# Patient Record
Sex: MALE | Race: WHITE | HISPANIC OR LATINO | Employment: OTHER
[De-identification: names, ages, dates, MRNs, and addresses within clinical notes are randomized per-mention and may not be internally consistent; named-entity substitution may affect disease eponyms.]

---

## 2017-08-15 ENCOUNTER — CHARTING TRANS (OUTPATIENT)
Dept: OTHER | Age: 82
End: 2017-08-15

## 2018-07-20 ENCOUNTER — CHARTING TRANS (OUTPATIENT)
Dept: OTHER | Age: 83
End: 2018-07-20

## 2018-07-27 ENCOUNTER — CHARTING TRANS (OUTPATIENT)
Dept: OTHER | Age: 83
End: 2018-07-27

## 2019-03-08 ENCOUNTER — EXTERNAL RECORD (OUTPATIENT)
Dept: OTHER | Age: 84
End: 2019-03-08

## 2019-05-31 ENCOUNTER — EXTERNAL RECORD (OUTPATIENT)
Dept: OTHER | Age: 84
End: 2019-05-31

## 2020-02-05 ENCOUNTER — OFFICE VISIT (OUTPATIENT)
Dept: OPHTHALMOLOGY | Age: 85
End: 2020-02-05

## 2020-02-05 DIAGNOSIS — E11.3593 PROLIFERATIVE DIABETIC RETINOPATHY OF BOTH EYES WITHOUT MACULAR EDEMA ASSOCIATED WITH TYPE 2 DIABETES MELLITUS (CMD): Primary | ICD-10-CM

## 2020-02-05 PROCEDURE — 92014 COMPRE OPH EXAM EST PT 1/>: CPT | Performed by: OPHTHALMOLOGY

## 2020-02-05 RX ORDER — FINASTERIDE 5 MG/1
TABLET, FILM COATED ORAL
Refills: 1 | COMMUNITY
Start: 2019-12-01

## 2020-02-05 RX ORDER — OMEPRAZOLE 20 MG/1
20 CAPSULE, DELAYED RELEASE ORAL
COMMUNITY

## 2020-02-05 RX ORDER — BACITRACIN ZINC 500 [USP'U]/G
OINTMENT TOPICAL
COMMUNITY

## 2020-02-05 RX ORDER — CLOPIDOGREL BISULFATE 75 MG/1
75 TABLET ORAL DAILY
COMMUNITY
Start: 2019-04-01 | End: 2021-01-01 | Stop reason: SDUPTHER

## 2020-02-05 RX ORDER — ACETAMINOPHEN 325 MG/1
650 TABLET ORAL
COMMUNITY
Start: 2019-11-02

## 2020-02-05 RX ORDER — CETIRIZINE HYDROCHLORIDE 10 MG/1
TABLET ORAL
Refills: 3 | COMMUNITY
Start: 2019-10-31

## 2020-02-05 RX ORDER — CARVEDILOL 3.12 MG/1
3.12 TABLET ORAL EVERY 12 HOURS
COMMUNITY
Start: 2019-04-01 | End: 2021-01-01 | Stop reason: SDUPTHER

## 2020-02-05 RX ORDER — BUMETANIDE 2 MG/1
2 TABLET ORAL EVERY 12 HOURS
COMMUNITY
Start: 2019-04-01

## 2020-02-05 RX ORDER — GABAPENTIN 300 MG/1
CAPSULE ORAL
Refills: 1 | COMMUNITY
Start: 2019-12-20

## 2020-02-05 RX ORDER — FLUTICASONE PROPIONATE 50 MCG
1 SPRAY, SUSPENSION (ML) NASAL
COMMUNITY
Start: 2016-03-12

## 2020-02-05 RX ORDER — LOSARTAN POTASSIUM 25 MG/1
25 TABLET ORAL
COMMUNITY
Start: 2019-10-31

## 2020-02-12 ENCOUNTER — APPOINTMENT (OUTPATIENT)
Dept: OPHTHALMOLOGY | Age: 85
End: 2020-02-12

## 2020-12-05 ENCOUNTER — APPOINTMENT (OUTPATIENT)
Dept: LAB | Facility: HOSPITAL | Age: 85
End: 2020-12-05
Attending: SURGERY
Payer: MEDICARE

## 2020-12-05 DIAGNOSIS — L97.909 ATHEROSCLEROSIS OF ARTERY OF EXTREMITY WITH ULCERATION (HCC): ICD-10-CM

## 2020-12-05 DIAGNOSIS — I70.299 ATHEROSCLEROSIS OF ARTERY OF EXTREMITY WITH ULCERATION (HCC): ICD-10-CM

## 2021-01-01 ENCOUNTER — APPOINTMENT (OUTPATIENT)
Dept: CARDIOLOGY | Age: 86
End: 2021-01-01

## 2021-01-01 PROCEDURE — 99232 SBSQ HOSP IP/OBS MODERATE 35: CPT | Performed by: NURSE PRACTITIONER

## 2021-01-01 PROCEDURE — 99221 1ST HOSP IP/OBS SF/LOW 40: CPT | Performed by: INTERNAL MEDICINE

## 2021-01-01 SDOH — HEALTH STABILITY: MENTAL HEALTH: HOW OFTEN DO YOU HAVE A DRINK CONTAINING ALCOHOL?: NEVER

## 2021-01-02 ENCOUNTER — LAB ENCOUNTER (OUTPATIENT)
Dept: LAB | Facility: HOSPITAL | Age: 86
DRG: 253 | End: 2021-01-02
Attending: SURGERY
Payer: MEDICARE

## 2021-01-02 DIAGNOSIS — L97.909 ATHEROSCLEROSIS OF ARTERY OF EXTREMITY WITH ULCERATION (HCC): ICD-10-CM

## 2021-01-02 DIAGNOSIS — I70.299 ATHEROSCLEROSIS OF ARTERY OF EXTREMITY WITH ULCERATION (HCC): ICD-10-CM

## 2021-01-02 LAB — SARS-COV-2 RNA RESP QL NAA+PROBE: NOT DETECTED

## 2021-01-05 ENCOUNTER — HOSPITAL ENCOUNTER (INPATIENT)
Dept: INTERVENTIONAL RADIOLOGY/VASCULAR | Facility: HOSPITAL | Age: 86
LOS: 3 days | Discharge: HOME | DRG: 253 | End: 2021-01-08
Attending: SURGERY | Admitting: SURGERY
Payer: MEDICARE

## 2021-01-05 DIAGNOSIS — I70.299 ATHEROSCLEROSIS OF ARTERY OF EXTREMITY WITH ULCERATION (HCC): Primary | ICD-10-CM

## 2021-01-05 DIAGNOSIS — L97.909 ATHEROSCLEROSIS OF ARTERY OF EXTREMITY WITH ULCERATION (HCC): Primary | ICD-10-CM

## 2021-01-05 LAB
GLUCOSE BLD-MCNC: 183 MG/DL (ref 70–99)
GLUCOSE BLD-MCNC: 205 MG/DL (ref 70–99)
GLUCOSE BLD-MCNC: 327 MG/DL (ref 70–99)

## 2021-01-05 PROCEDURE — 99222 1ST HOSP IP/OBS MODERATE 55: CPT | Performed by: HOSPITALIST

## 2021-01-05 RX ORDER — MORPHINE SULFATE 4 MG/ML
8 INJECTION, SOLUTION INTRAMUSCULAR; INTRAVENOUS
Status: DISCONTINUED | OUTPATIENT
Start: 2021-01-05 | End: 2021-01-08

## 2021-01-05 RX ORDER — FUROSEMIDE 40 MG/1
40 TABLET ORAL
Status: DISCONTINUED | OUTPATIENT
Start: 2021-01-05 | End: 2021-01-07

## 2021-01-05 RX ORDER — CARVEDILOL 3.12 MG/1
3.12 TABLET ORAL DAILY
Status: DISCONTINUED | OUTPATIENT
Start: 2021-01-05 | End: 2021-01-08

## 2021-01-05 RX ORDER — SODIUM CHLORIDE 9 MG/ML
INJECTION, SOLUTION INTRAVENOUS CONTINUOUS
Status: DISCONTINUED | OUTPATIENT
Start: 2021-01-05 | End: 2021-01-08

## 2021-01-05 RX ORDER — CLOPIDOGREL BISULFATE 75 MG/1
75 TABLET ORAL NIGHTLY
Status: DISCONTINUED | OUTPATIENT
Start: 2021-01-05 | End: 2021-01-08

## 2021-01-05 RX ORDER — ATORVASTATIN CALCIUM 20 MG/1
20 TABLET, FILM COATED ORAL NIGHTLY
Status: DISCONTINUED | OUTPATIENT
Start: 2021-01-05 | End: 2021-01-08

## 2021-01-05 RX ORDER — ENALAPRIL MALEATE 5 MG/1
5 TABLET ORAL DAILY
Status: DISCONTINUED | OUTPATIENT
Start: 2021-01-06 | End: 2021-01-08

## 2021-01-05 RX ORDER — PANTOPRAZOLE SODIUM 40 MG/1
40 TABLET, DELAYED RELEASE ORAL
Status: DISCONTINUED | OUTPATIENT
Start: 2021-01-06 | End: 2021-01-08

## 2021-01-05 RX ORDER — HYDROCODONE BITARTRATE AND ACETAMINOPHEN 5; 325 MG/1; MG/1
1 TABLET ORAL EVERY 4 HOURS PRN
Status: DISCONTINUED | OUTPATIENT
Start: 2021-01-05 | End: 2021-01-08

## 2021-01-05 RX ORDER — GABAPENTIN 300 MG/1
300 CAPSULE ORAL 2 TIMES DAILY
Status: DISCONTINUED | OUTPATIENT
Start: 2021-01-05 | End: 2021-01-08

## 2021-01-05 RX ORDER — ASPIRIN 81 MG/1
81 TABLET ORAL DAILY
Status: DISCONTINUED | OUTPATIENT
Start: 2021-01-06 | End: 2021-01-08

## 2021-01-05 RX ORDER — LIDOCAINE HYDROCHLORIDE 10 MG/ML
INJECTION, SOLUTION EPIDURAL; INFILTRATION; INTRACAUDAL; PERINEURAL
Status: COMPLETED
Start: 2021-01-05 | End: 2021-01-05

## 2021-01-05 RX ORDER — HEPARIN SODIUM 5000 [USP'U]/ML
5000 INJECTION, SOLUTION INTRAVENOUS; SUBCUTANEOUS EVERY 8 HOURS SCHEDULED
Status: DISCONTINUED | OUTPATIENT
Start: 2021-01-05 | End: 2021-01-06

## 2021-01-05 RX ORDER — DIPHENHYDRAMINE HCL 25 MG
25 CAPSULE ORAL EVERY 6 HOURS PRN
Status: DISCONTINUED | OUTPATIENT
Start: 2021-01-05 | End: 2021-01-08

## 2021-01-05 RX ORDER — HYDROCODONE BITARTRATE AND ACETAMINOPHEN 5; 325 MG/1; MG/1
2 TABLET ORAL EVERY 4 HOURS PRN
Status: DISCONTINUED | OUTPATIENT
Start: 2021-01-05 | End: 2021-01-08

## 2021-01-05 RX ORDER — DEXTROSE MONOHYDRATE 25 G/50ML
50 INJECTION, SOLUTION INTRAVENOUS
Status: DISCONTINUED | OUTPATIENT
Start: 2021-01-05 | End: 2021-01-08

## 2021-01-05 RX ORDER — MORPHINE SULFATE 4 MG/ML
2 INJECTION, SOLUTION INTRAMUSCULAR; INTRAVENOUS
Status: DISCONTINUED | OUTPATIENT
Start: 2021-01-05 | End: 2021-01-08

## 2021-01-05 RX ORDER — HEPARIN SODIUM 5000 [USP'U]/ML
INJECTION, SOLUTION INTRAVENOUS; SUBCUTANEOUS
Status: COMPLETED
Start: 2021-01-05 | End: 2021-01-05

## 2021-01-05 RX ORDER — ALBUTEROL SULFATE 90 UG/1
2 AEROSOL, METERED RESPIRATORY (INHALATION) EVERY 6 HOURS PRN
Status: DISCONTINUED | OUTPATIENT
Start: 2021-01-05 | End: 2021-01-08

## 2021-01-05 RX ORDER — ASCORBIC ACID 500 MG
500 TABLET ORAL DAILY
Status: DISCONTINUED | OUTPATIENT
Start: 2021-01-05 | End: 2021-01-08

## 2021-01-05 RX ORDER — DOCUSATE SODIUM 100 MG/1
100 CAPSULE, LIQUID FILLED ORAL 2 TIMES DAILY
Status: DISCONTINUED | OUTPATIENT
Start: 2021-01-05 | End: 2021-01-08

## 2021-01-05 RX ORDER — CETIRIZINE HYDROCHLORIDE 10 MG/1
10 TABLET ORAL NIGHTLY
Status: DISCONTINUED | OUTPATIENT
Start: 2021-01-05 | End: 2021-01-08

## 2021-01-05 RX ORDER — GABAPENTIN 100 MG/1
300 CAPSULE ORAL DAILY
Status: DISCONTINUED | OUTPATIENT
Start: 2021-01-06 | End: 2021-01-05

## 2021-01-05 RX ORDER — MORPHINE SULFATE 4 MG/ML
4 INJECTION, SOLUTION INTRAMUSCULAR; INTRAVENOUS
Status: DISCONTINUED | OUTPATIENT
Start: 2021-01-05 | End: 2021-01-08

## 2021-01-05 RX ORDER — MELATONIN
325
Status: DISCONTINUED | OUTPATIENT
Start: 2021-01-06 | End: 2021-01-08

## 2021-01-05 RX ORDER — BUMETANIDE 1 MG/1
2 TABLET ORAL
Status: DISCONTINUED | OUTPATIENT
Start: 2021-01-05 | End: 2021-01-08

## 2021-01-05 RX ADMIN — DOCUSATE SODIUM 100 MG: 100 CAPSULE, LIQUID FILLED ORAL at 21:18:00

## 2021-01-05 RX ADMIN — GABAPENTIN 300 MG: 300 CAPSULE ORAL at 21:18:00

## 2021-01-05 RX ADMIN — ATORVASTATIN CALCIUM 20 MG: 20 TABLET, FILM COATED ORAL at 21:18:00

## 2021-01-05 RX ADMIN — CLOPIDOGREL BISULFATE 75 MG: 75 TABLET ORAL at 21:19:00

## 2021-01-05 RX ADMIN — HEPARIN SODIUM 5000 UNITS: 5000 INJECTION, SOLUTION INTRAVENOUS; SUBCUTANEOUS at 21:19:00

## 2021-01-05 RX ADMIN — CETIRIZINE HYDROCHLORIDE 10 MG: 10 TABLET ORAL at 21:19:00

## 2021-01-05 RX ADMIN — BUMETANIDE 2 MG: 1 TABLET ORAL at 18:31:00

## 2021-01-05 RX ADMIN — FUROSEMIDE 40 MG: 40 TABLET ORAL at 18:31:00

## 2021-01-05 RX ADMIN — SODIUM CHLORIDE: 9 INJECTION, SOLUTION INTRAVENOUS at 13:30:00

## 2021-01-05 NOTE — PROGRESS NOTES
Report given to [facility].  Pt to floor via bed without apparent distress

## 2021-01-05 NOTE — H&P
[facility]  Vascular Surgery Consultation    Lele April.  Patient Status:  Outpatient in a Bed    1933 MRN [id number]   Location [address] [facility] Attending Alcira Oshea MD   Hosp Day # 0 PCP No primary care provi Maleate (VASOTEC) tab 5 mg, 5 mg, Oral, Daily  •  [START ON 1/6/2021] Ferrous Sulfate TABS 325 mg, 325 mg, Oral, Daily with breakfast  •  furosemide (LASIX) tab 40 mg, 40 mg, Oral, BID  •  gabapentin (NEURONTIN) cap 300 mg, 300 mg, Oral, Daily  •  Albutero rashes, no suspicious lesions, warm and dry  EXTREMITIES: no edema, full range of motion, no tenderness  NEURO/PSYCH: orientated x3, normal mood and affect, no sensory or motor deficit      Laboratory Data:  Lab Results   Component Value Date    PGLU 183 0

## 2021-01-05 NOTE — CONSULTS
[facility]  [facility] Patient Status:  Outpatient in a Bed    1933 MRN [id number]   [facility] Page Hernandez MD   Hosp Day # 0 PCP No primary care provider on file. OTHER (SEE COMMENTS)    Comment:hallucinations             hallucinations    Medications:  No current facility-administered medications on file prior to encounter. •  acetaminophen 325 MG Oral Tab, Take 650 mg by mouth every 6 (six) hours as needed.   , before meals.   , Disp: , Rfl:     •  Omeprazole 40 MG Oral Capsule Delayed Release, , Disp: , Rfl:     •  Polyethylene Glycol 3350 17 GM/SCOOP Oral Powder, Take 17 g by mouth., Disp: , Rfl:     •  Potassium Chloride ER 8 MEQ Oral Tab CR, take 2 tablet by o data reviewed in Epic. ASSESSMENT / PLAN:     1. B/l diabetic/vascular foot ulcers  2. PVD  1. Suspected tibial disease  2. Vascular to do angiogram Thursday. 3. Pt did not want opioids. Would avoid NSAIDs w/ CKD. 4. Tylenol  5.  Increase gabapentin

## 2021-01-06 ENCOUNTER — APPOINTMENT (OUTPATIENT)
Dept: CV DIAGNOSTICS | Facility: HOSPITAL | Age: 86
DRG: 253 | End: 2021-01-06
Attending: HOSPITALIST
Payer: MEDICARE

## 2021-01-06 LAB
ALBUMIN SERPL-MCNC: 3.4 G/DL (ref 3.4–5)
ALBUMIN/GLOB SERPL: 0.9 {RATIO} (ref 1–2)
ALP LIVER SERPL-CCNC: 95 U/L
ALT SERPL-CCNC: 21 U/L
ANION GAP SERPL CALC-SCNC: 4 MMOL/L (ref 0–18)
APTT PPP: 32 SECONDS (ref 25.4–36.1)
APTT PPP: 71.7 SECONDS (ref 25.4–36.1)
AST SERPL-CCNC: 20 U/L (ref 15–37)
ATRIAL RATE: 416 BPM
BASOPHILS # BLD AUTO: 0.03 X10(3) UL (ref 0–0.2)
BASOPHILS NFR BLD AUTO: 0.5 %
BILIRUB SERPL-MCNC: 0.7 MG/DL (ref 0.1–2)
BUN BLD-MCNC: 38 MG/DL (ref 7–18)
BUN/CREAT SERPL: 25.2 (ref 10–20)
CALCIUM BLD-MCNC: 9 MG/DL (ref 8.5–10.1)
CHLORIDE SERPL-SCNC: 104 MMOL/L (ref 98–112)
CO2 SERPL-SCNC: 33 MMOL/L (ref 21–32)
CREAT BLD-MCNC: 1.51 MG/DL
DEPRECATED HBV CORE AB SER IA-ACNC: 107.6 NG/ML
DEPRECATED RDW RBC AUTO: 45.2 FL (ref 35.1–46.3)
EOSINOPHIL # BLD AUTO: 0.2 X10(3) UL (ref 0–0.7)
EOSINOPHIL NFR BLD AUTO: 3.3 %
ERYTHROCYTE [DISTWIDTH] IN BLOOD BY AUTOMATED COUNT: 12.8 % (ref 11–15)
EST. AVERAGE GLUCOSE BLD GHB EST-MCNC: 206 MG/DL (ref 68–126)
GLOBULIN PLAS-MCNC: 3.7 G/DL (ref 2.8–4.4)
GLUCOSE BLD-MCNC: 126 MG/DL (ref 70–99)
GLUCOSE BLD-MCNC: 147 MG/DL (ref 70–99)
GLUCOSE BLD-MCNC: 161 MG/DL (ref 70–99)
GLUCOSE BLD-MCNC: 163 MG/DL (ref 70–99)
GLUCOSE BLD-MCNC: 324 MG/DL (ref 70–99)
GLUCOSE BLD-MCNC: 393 MG/DL (ref 70–99)
HBA1C MFR BLD HPLC: 8.8 % (ref ?–5.7)
HCT VFR BLD AUTO: 34.3 %
HGB BLD-MCNC: 10.8 G/DL
IMM GRANULOCYTES # BLD AUTO: 0.01 X10(3) UL (ref 0–1)
IMM GRANULOCYTES NFR BLD: 0.2 %
IRON SATURATION: 21 %
IRON SERPL-MCNC: 63 UG/DL
LYMPHOCYTES # BLD AUTO: 1.86 X10(3) UL (ref 1–4)
LYMPHOCYTES NFR BLD AUTO: 31 %
M PROTEIN MFR SERPL ELPH: 7.1 G/DL (ref 6.4–8.2)
MCH RBC QN AUTO: 30.3 PG (ref 26–34)
MCHC RBC AUTO-ENTMCNC: 31.5 G/DL (ref 31–37)
MCV RBC AUTO: 96.3 FL
MONOCYTES # BLD AUTO: 0.51 X10(3) UL (ref 0.1–1)
MONOCYTES NFR BLD AUTO: 8.5 %
NEUTROPHILS # BLD AUTO: 3.39 X10 (3) UL (ref 1.5–7.7)
NEUTROPHILS # BLD AUTO: 3.39 X10(3) UL (ref 1.5–7.7)
NEUTROPHILS NFR BLD AUTO: 56.5 %
OSMOLALITY SERPL CALC.SUM OF ELEC: 303 MOSM/KG (ref 275–295)
PLATELET # BLD AUTO: 197 10(3)UL (ref 150–450)
POTASSIUM SERPL-SCNC: 4.2 MMOL/L (ref 3.5–5.1)
Q-T INTERVAL: 434 MS
QRS DURATION: 134 MS
QTC CALCULATION (BEZET): 497 MS
R AXIS: 269 DEGREES
RBC # BLD AUTO: 3.56 X10(6)UL
SODIUM SERPL-SCNC: 141 MMOL/L (ref 136–145)
T AXIS: 91 DEGREES
TOTAL IRON BINDING CAPACITY: 304 UG/DL (ref 240–450)
TRANSFERRIN SERPL-MCNC: 204 MG/DL (ref 200–360)
TSI SER-ACNC: 1.12 MIU/ML (ref 0.36–3.74)
VENTRICULAR RATE: 79 BPM
WBC # BLD AUTO: 6 X10(3) UL (ref 4–11)

## 2021-01-06 PROCEDURE — 93306 TTE W/DOPPLER COMPLETE: CPT | Performed by: HOSPITALIST

## 2021-01-06 PROCEDURE — 99233 SBSQ HOSP IP/OBS HIGH 50: CPT | Performed by: INTERNAL MEDICINE

## 2021-01-06 RX ORDER — HEPARIN SODIUM AND DEXTROSE 10000; 5 [USP'U]/100ML; G/100ML
12 INJECTION INTRAVENOUS ONCE
Status: COMPLETED | OUTPATIENT
Start: 2021-01-06 | End: 2021-01-06

## 2021-01-06 RX ORDER — HEPARIN SODIUM AND DEXTROSE 10000; 5 [USP'U]/100ML; G/100ML
INJECTION INTRAVENOUS CONTINUOUS
Status: DISCONTINUED | OUTPATIENT
Start: 2021-01-06 | End: 2021-01-06

## 2021-01-06 RX ORDER — HEPARIN SODIUM AND DEXTROSE 10000; 5 [USP'U]/100ML; G/100ML
INJECTION INTRAVENOUS CONTINUOUS
Status: DISCONTINUED | OUTPATIENT
Start: 2021-01-06 | End: 2021-01-07

## 2021-01-06 RX ADMIN — CARVEDILOL 3.12 MG: 3.12 TABLET ORAL at 08:47:00

## 2021-01-06 RX ADMIN — DOCUSATE SODIUM 100 MG: 100 CAPSULE, LIQUID FILLED ORAL at 21:50:00

## 2021-01-06 RX ADMIN — ENALAPRIL MALEATE 5 MG: 5 TABLET ORAL at 08:47:00

## 2021-01-06 RX ADMIN — MELATONIN 325 MG: at 08:48:00

## 2021-01-06 RX ADMIN — ASCORBIC ACID 500 MG: 500 MG TABLET ORAL at 08:48:00

## 2021-01-06 RX ADMIN — FUROSEMIDE 40 MG: 40 TABLET ORAL at 08:47:00

## 2021-01-06 RX ADMIN — CLOPIDOGREL BISULFATE 75 MG: 75 TABLET ORAL at 21:50:00

## 2021-01-06 RX ADMIN — DOCUSATE SODIUM 100 MG: 100 CAPSULE, LIQUID FILLED ORAL at 08:47:00

## 2021-01-06 RX ADMIN — DIPHENHYDRAMINE HCL 25 MG: 25 MG CAPSULE ORAL at 08:48:00

## 2021-01-06 RX ADMIN — GABAPENTIN 300 MG: 300 CAPSULE ORAL at 21:51:00

## 2021-01-06 RX ADMIN — CETIRIZINE HYDROCHLORIDE 10 MG: 10 TABLET ORAL at 21:51:00

## 2021-01-06 RX ADMIN — BUMETANIDE 2 MG: 1 TABLET ORAL at 16:57:00

## 2021-01-06 RX ADMIN — HEPARIN SODIUM 5000 UNITS: 5000 INJECTION, SOLUTION INTRAVENOUS; SUBCUTANEOUS at 06:09:00

## 2021-01-06 RX ADMIN — FUROSEMIDE 40 MG: 40 TABLET ORAL at 16:57:00

## 2021-01-06 RX ADMIN — BUMETANIDE 2 MG: 1 TABLET ORAL at 08:48:00

## 2021-01-06 RX ADMIN — HEPARIN SODIUM AND DEXTROSE 12 UNITS/KG/HR: 10000; 5 INJECTION INTRAVENOUS at 14:13:00

## 2021-01-06 RX ADMIN — GABAPENTIN 300 MG: 300 CAPSULE ORAL at 08:47:00

## 2021-01-06 RX ADMIN — ASPIRIN 81 MG: 81 TABLET ORAL at 08:47:00

## 2021-01-06 RX ADMIN — PANTOPRAZOLE SODIUM 40 MG: 40 TABLET, DELAYED RELEASE ORAL at 06:09:00

## 2021-01-06 RX ADMIN — ATORVASTATIN CALCIUM 20 MG: 20 TABLET, FILM COATED ORAL at 21:50:00

## 2021-01-06 NOTE — PLAN OF CARE
Assumed pt care at 0730  VSS  Pt denies pain  R big toe amputation d/I  Afib on tele- started on Heparin gtt- running at 9ml/hr, next pTT at 2000  Echo completed  Plan for angiogram tomorrow AM  NPO at midnight  Pt up in chair and ambulating short distance Modify environment to reduce risk of injury  - Provide assistive devices as appropriate  - Consider OT/PT consult to assist with strengthening/mobility  - Encourage toileting schedule  Outcome: Progressing     Problem: Altered Communication/Language Jermaine White

## 2021-01-06 NOTE — PLAN OF CARE
Assumed care at [address]. Alert and oriented x4. Telemetry monitor reading A-fib. Notified Dr. Kellie Lazcano of New onset A-fib. Orders received and Dr. Lizy Grey consulted. No pain. Fall precautions maintained per protocol. Plan for ECHO in am and Angiogram on Thursday.

## 2021-01-06 NOTE — PLAN OF CARE
NURSING ADMISSION NOTE      Patient admitted via Cart  Oriented to room. Safety precautions initiated. Bed in low position. Call light in reach.   Admission navigator completed with son     Assumed pt care at 65  Pt denies pain  A&Ox4, Rosamaria jasso

## 2021-01-06 NOTE — CONSULTS
[facility]  Report of Consultation    Imelda Reeder. Patient Status:  Observation    1933 MRN [id number]   [facility] 7NE-A Attending Kiko Hayes MD   Hosp Day # 0 PCP No primary care provider on file.      Reason for family history. reports that he has never smoked. He has never used smokeless tobacco. He reports that he does not drink alcohol or use drugs.     Allergies:    Clindamycin/Lincomy*    OTHER (SEE COMMENTS)    Comment:hallucinations             hallucinati Oral, Q15 Min PRN **OR** dextrose 50 % injection 50 mL, 50 mL, Intravenous, Q15 Min PRN **OR** glucose (DEX4) oral liquid 30 g, 30 g, Oral, Q15 Min PRN **OR** Glucose-Vitamin C (DEX-4) chewable tab 8 tablet, 8 tablet, Oral, Q15 Min PRN  •  Insulin Aspart P pending. 2.  Atrial fibrillation -patient's chart suggest that he has had some level of A. fib whether this is permanent or paroxysmal is unclear. I do not see any evidence of him being on an anticoagulant with the records that are available to us.   He

## 2021-01-06 NOTE — PROGRESS NOTES
[facility]ITAL[facility]  Progress Note     Qiana Herrera. Patient Status:  Observation    1933 MRN [id number]   [facility] 7NE-A Attending Mirella Hayes MD   Hosp Day # 0 PCP No primary care provider on file.      Reason for Consult: Component Value Date    TSH 1.120 01/06/2021       Imaging: Imaging data reviewed in Epic.     Medications:   • ascorbic acid  500 mg Oral Daily   • aspirin  81 mg Oral Daily   • atorvastatin  20 mg Oral Nightly   • bumetanide  2 mg Oral BID (Diuretic) midnight's based on the clinical documentation in H+P. Based on patients current state of illness, I anticipate that, after discharge, patient will require TBD.

## 2021-01-06 NOTE — RESPIRATORY THERAPY NOTE
ELA - Equipment Use Daily Summary:                  . Set Mode: AUTO CPAP WITH C-FLEX                . Usage in hours: 2:18                . 90% Pressure (EPAP) level: 12.5                . 90% Insp. Pressure (IPAP): Efrain Daily  AHI: 22.3

## 2021-01-07 ENCOUNTER — APPOINTMENT (OUTPATIENT)
Dept: INTERVENTIONAL RADIOLOGY/VASCULAR | Facility: HOSPITAL | Age: 86
DRG: 253 | End: 2021-01-07
Attending: SURGERY
Payer: MEDICARE

## 2021-01-07 LAB
ANION GAP SERPL CALC-SCNC: 0 MMOL/L (ref 0–18)
APTT PPP: 170.5 SECONDS (ref 25.4–36.1)
BUN BLD-MCNC: 40 MG/DL (ref 7–18)
BUN/CREAT SERPL: 25 (ref 10–20)
CALCIUM BLD-MCNC: 9 MG/DL (ref 8.5–10.1)
CHLORIDE SERPL-SCNC: 104 MMOL/L (ref 98–112)
CO2 SERPL-SCNC: 36 MMOL/L (ref 21–32)
CREAT BLD-MCNC: 1.6 MG/DL
DEPRECATED RDW RBC AUTO: 46.1 FL (ref 35.1–46.3)
ERYTHROCYTE [DISTWIDTH] IN BLOOD BY AUTOMATED COUNT: 13 % (ref 11–15)
GLUCOSE BLD-MCNC: 156 MG/DL (ref 70–99)
GLUCOSE BLD-MCNC: 157 MG/DL (ref 70–99)
GLUCOSE BLD-MCNC: 157 MG/DL (ref 70–99)
GLUCOSE BLD-MCNC: 230 MG/DL (ref 70–99)
GLUCOSE BLD-MCNC: 266 MG/DL (ref 70–99)
HCT VFR BLD AUTO: 31.1 %
HGB BLD-MCNC: 9.8 G/DL
MCH RBC QN AUTO: 30.3 PG (ref 26–34)
MCHC RBC AUTO-ENTMCNC: 31.5 G/DL (ref 31–37)
MCV RBC AUTO: 96.3 FL
OSMOLALITY SERPL CALC.SUM OF ELEC: 303 MOSM/KG (ref 275–295)
PLATELET # BLD AUTO: 173 10(3)UL (ref 150–450)
POTASSIUM SERPL-SCNC: 4.4 MMOL/L (ref 3.5–5.1)
RBC # BLD AUTO: 3.23 X10(6)UL
SODIUM SERPL-SCNC: 140 MMOL/L (ref 136–145)
WBC # BLD AUTO: 6.5 X10(3) UL (ref 4–11)

## 2021-01-07 PROCEDURE — 99232 SBSQ HOSP IP/OBS MODERATE 35: CPT | Performed by: STUDENT IN AN ORGANIZED HEALTH CARE EDUCATION/TRAINING PROGRAM

## 2021-01-07 PROCEDURE — 047M3ZZ DILATION OF RIGHT POPLITEAL ARTERY, PERCUTANEOUS APPROACH: ICD-10-PCS | Performed by: SURGERY

## 2021-01-07 PROCEDURE — 047T3ZZ DILATION OF RIGHT PERONEAL ARTERY, PERCUTANEOUS APPROACH: ICD-10-PCS | Performed by: SURGERY

## 2021-01-07 PROCEDURE — B41F1ZZ FLUOROSCOPY OF RIGHT LOWER EXTREMITY ARTERIES USING LOW OSMOLAR CONTRAST: ICD-10-PCS | Performed by: SURGERY

## 2021-01-07 PROCEDURE — 047K3ZZ DILATION OF RIGHT FEMORAL ARTERY, PERCUTANEOUS APPROACH: ICD-10-PCS | Performed by: SURGERY

## 2021-01-07 RX ORDER — LIDOCAINE HYDROCHLORIDE 10 MG/ML
INJECTION, SOLUTION EPIDURAL; INFILTRATION; INTRACAUDAL; PERINEURAL
Status: COMPLETED
Start: 2021-01-07 | End: 2021-01-07

## 2021-01-07 RX ORDER — HEPARIN SODIUM AND DEXTROSE 10000; 5 [USP'U]/100ML; G/100ML
INJECTION INTRAVENOUS CONTINUOUS
Status: DISCONTINUED | OUTPATIENT
Start: 2021-01-07 | End: 2021-01-07

## 2021-01-07 RX ORDER — SODIUM CHLORIDE 9 MG/ML
INJECTION, SOLUTION INTRAVENOUS CONTINUOUS
Status: ACTIVE | OUTPATIENT
Start: 2021-01-07 | End: 2021-01-07

## 2021-01-07 RX ORDER — CLOPIDOGREL BISULFATE 75 MG/1
75 TABLET ORAL DAILY
Status: DISCONTINUED | OUTPATIENT
Start: 2021-01-08 | End: 2021-01-08

## 2021-01-07 RX ORDER — SODIUM CHLORIDE 9 MG/ML
INJECTION, SOLUTION INTRAVENOUS CONTINUOUS
Status: DISCONTINUED | OUTPATIENT
Start: 2021-01-07 | End: 2021-01-08

## 2021-01-07 RX ORDER — MORPHINE SULFATE 4 MG/ML
2 INJECTION, SOLUTION INTRAMUSCULAR; INTRAVENOUS EVERY 2 HOUR PRN
Status: DISCONTINUED | OUTPATIENT
Start: 2021-01-07 | End: 2021-01-08

## 2021-01-07 RX ORDER — HEPARIN SODIUM 5000 [USP'U]/ML
INJECTION, SOLUTION INTRAVENOUS; SUBCUTANEOUS
Status: COMPLETED
Start: 2021-01-07 | End: 2021-01-07

## 2021-01-07 RX ORDER — MIDAZOLAM HYDROCHLORIDE 1 MG/ML
INJECTION INTRAMUSCULAR; INTRAVENOUS
Status: COMPLETED
Start: 2021-01-07 | End: 2021-01-07

## 2021-01-07 RX ORDER — CLOPIDOGREL BISULFATE 75 MG/1
TABLET ORAL
Status: COMPLETED
Start: 2021-01-07 | End: 2021-01-07

## 2021-01-07 RX ORDER — MORPHINE SULFATE 4 MG/ML
4 INJECTION, SOLUTION INTRAMUSCULAR; INTRAVENOUS EVERY 2 HOUR PRN
Status: DISCONTINUED | OUTPATIENT
Start: 2021-01-07 | End: 2021-01-08

## 2021-01-07 RX ORDER — MORPHINE SULFATE 4 MG/ML
1 INJECTION, SOLUTION INTRAMUSCULAR; INTRAVENOUS EVERY 2 HOUR PRN
Status: DISCONTINUED | OUTPATIENT
Start: 2021-01-07 | End: 2021-01-08

## 2021-01-07 RX ORDER — HEPARIN SODIUM AND DEXTROSE 10000; 5 [USP'U]/100ML; G/100ML
12 INJECTION INTRAVENOUS ONCE
Status: DISCONTINUED | OUTPATIENT
Start: 2021-01-07 | End: 2021-01-07

## 2021-01-07 RX ADMIN — SODIUM CHLORIDE: 9 INJECTION, SOLUTION INTRAVENOUS at 14:57:00

## 2021-01-07 RX ADMIN — ATORVASTATIN CALCIUM 20 MG: 20 TABLET, FILM COATED ORAL at 22:14:00

## 2021-01-07 RX ADMIN — GABAPENTIN 300 MG: 300 CAPSULE ORAL at 12:00:00

## 2021-01-07 RX ADMIN — ASCORBIC ACID 500 MG: 500 MG TABLET ORAL at 11:59:00

## 2021-01-07 RX ADMIN — MELATONIN 325 MG: at 12:00:00

## 2021-01-07 RX ADMIN — SODIUM CHLORIDE: 9 INJECTION, SOLUTION INTRAVENOUS at 12:01:00

## 2021-01-07 RX ADMIN — DOCUSATE SODIUM 100 MG: 100 CAPSULE, LIQUID FILLED ORAL at 22:15:00

## 2021-01-07 RX ADMIN — ASPIRIN 81 MG: 81 TABLET ORAL at 12:00:00

## 2021-01-07 RX ADMIN — GABAPENTIN 300 MG: 300 CAPSULE ORAL at 22:14:00

## 2021-01-07 RX ADMIN — SODIUM CHLORIDE: 9 INJECTION, SOLUTION INTRAVENOUS at 16:14:00

## 2021-01-07 RX ADMIN — CARVEDILOL 3.12 MG: 3.12 TABLET ORAL at 09:00:00

## 2021-01-07 RX ADMIN — CETIRIZINE HYDROCHLORIDE 10 MG: 10 TABLET ORAL at 22:14:00

## 2021-01-07 RX ADMIN — ENALAPRIL MALEATE 5 MG: 5 TABLET ORAL at 09:00:00

## 2021-01-07 RX ADMIN — DOCUSATE SODIUM 100 MG: 100 CAPSULE, LIQUID FILLED ORAL at 12:00:00

## 2021-01-07 NOTE — PROGRESS NOTES
Notified about oozing after angiogram  Came to evaluate, appears dry now  Continue fem stop until 4   Will hold off on heparin and start low dose eliquis in am

## 2021-01-07 NOTE — PLAN OF CARE
Assumed care at 1. Alert and oriented x4, forgetful. Telemetry monitor reading A-fib. Heparin drip infusing assessed and maintained. No pain. Fall precautions maintained per protocol. Plan for Angiogram today. Call light in reach at bedside.  Will contin

## 2021-01-07 NOTE — PROGRESS NOTES
[facility]  Cardiology Progress Note    Daniel Macias. Patient Status:  Inpatient    1933 MRN [id number]   [facility] 7NE-A Attending Erna Spann MD   Hosp Day # 2 PCP No primary care provider on file.        Subjective: 300 mg Oral BID     • Continuous dose Heparin infusion     • sodium chloride Stopped (01/05/21 1700)     Echo 1/6/21  Conclusions:     1.  Left ventricle: The cavity size was normal. Wall thickness was normal.       Systolic function was mildly reduced.  Evie Chacko no major bleeding events in that time period. He also reports long standing Afib.    o [id number] ~8  • CAD hx remote CABG-asa  • Chronic HFrEF, 40%, echo as above  o Bumex, coreg, acei  o Mild aortic stenosis, mild MR, mild-mod TR, pHTN PAS 55-60  • HTN  •

## 2021-01-07 NOTE — BRIEF OP NOTE
Pre-Operative Diagnosis: Atherosclerosis with ulcer right leg     Post-Operative Diagnosis: Same     Procedure Performed:   1. Ultrasound-guided percutaneous access left common femoral artery  2.   Selection of right common femoral artery and angiogram rig

## 2021-01-07 NOTE — PROCEDURES
[address]    PATIENT'S NAME: [facility]   ATTENDING PHYSICIAN: Beto Kelly M.D. OPERATING PHYSICIAN: Beto Kelly M.D.    PATIENT ACCOUNT#:   [de-identified]    LOCATION:  [address] [place] [address]  MEDICAL RECORD #:   [id number]       DATE angiogram as described below. DETAILS OF PROCEDURE:  Patient was taken to the catheterization lab, prepped and draped in sterile fashion. Moderate conscious sedation was initiated by a qualified nurse under my supervision.   Using ultrasound, I percutan and the [address], I advanced it down to the level of the mid popliteal artery. I then selected a 6 x 60 balloon and balloon angioplastied the superficial femoral artery for the 2 stenoses in the proximal and mid superficial femoral artery.   Rep

## 2021-01-07 NOTE — PAYOR COMM NOTE
--------------  ADMISSION REVIEW     Payor: [address] #:  [id number]  Authorization Number: [id number]    Admit date: 1/5/21  Admit time: 1716       Admitting Physician: Mervin Levy MD  Attending Physician:  Mervin Levy MD bruits  RESPIRATORY: no rales, rhonchi, or wheezes B  CARDIO: RRR without murmur, no murmur, no gallop   ABDOMEN: soft, non-tender with no palpable aneurysm or masses  BACK: normal, no tenderness  SKIN: no rashes, no suspicious lesions, warm and dry  EXTRE Mildly calcified annulus. Mitral valve demonstrates mildly       thickened leaflets. There was mild regurgitation. 6.  Left atrium: The left atrium was markedly dilated. 7.  Right ventricle: The cavity size was mildly increased.  Systolic function      Dose Route User    1/7/2021 1200 Given 325 mg Oral Bolivar Stanislav, RN      furosemide (LASIX) tab 40 mg     Date Action Dose Route User    1/6/2021 1657 Given 40 mg Oral Rodrigo Yañez, RN      gabapentin (NEURONTIN) cap 300 mg     Date Action Dose Rout    Procedure Performed:   1. Ultrasound-guided percutaneous access left common femoral artery  2. Selection of right common femoral artery and angiogram right leg  3.   Balloon angioplasty of SFA in 2 inflations for 2 tandem high-grade stenoses with 6

## 2021-01-07 NOTE — PROGRESS NOTES
[facility]  Progress Note     Magdiel Eric. Patient Status:  Observation    1933 MRN [id number]   [facility] 7NE-A Attending Isha Matamoros MD   Hosp Day # 2 PCP No primary care provider on file.      Reason for Consult: Intravenous Once   • ascorbic acid  500 mg Oral Daily   • aspirin  81 mg Oral Daily   • atorvastatin  20 mg Oral Nightly   • bumetanide  2 mg Oral BID (Diuretic)   • carvedilol  3.125 mg Oral Daily   • cetirizine  10 mg Oral Nightly   • Clopidogrel Bisulfa

## 2021-01-07 NOTE — CM/SW NOTE
Pt's son wants a new PCP for pt at [facility] PCP list given to son. SW to follow for any other needs.

## 2021-01-08 ENCOUNTER — PATIENT OUTREACH (OUTPATIENT)
Dept: CASE MANAGEMENT | Age: 86
End: 2021-01-08

## 2021-01-08 VITALS
RESPIRATION RATE: 15 BRPM | HEIGHT: 69 IN | HEART RATE: 68 BPM | DIASTOLIC BLOOD PRESSURE: 49 MMHG | BODY MASS INDEX: 24.91 KG/M2 | OXYGEN SATURATION: 97 % | WEIGHT: 168.19 LBS | SYSTOLIC BLOOD PRESSURE: 95 MMHG | TEMPERATURE: 98 F

## 2021-01-08 LAB
ANION GAP SERPL CALC-SCNC: 3 MMOL/L (ref 0–18)
BUN BLD-MCNC: 37 MG/DL (ref 7–18)
BUN/CREAT SERPL: 30.8 (ref 10–20)
CALCIUM BLD-MCNC: 8.8 MG/DL (ref 8.5–10.1)
CHLORIDE SERPL-SCNC: 106 MMOL/L (ref 98–112)
CO2 SERPL-SCNC: 31 MMOL/L (ref 21–32)
CREAT BLD-MCNC: 1.2 MG/DL
GLUCOSE BLD-MCNC: 130 MG/DL (ref 70–99)
GLUCOSE BLD-MCNC: 134 MG/DL (ref 70–99)
GLUCOSE BLD-MCNC: 281 MG/DL (ref 70–99)
OSMOLALITY SERPL CALC.SUM OF ELEC: 300 MOSM/KG (ref 275–295)
POTASSIUM SERPL-SCNC: 4.7 MMOL/L (ref 3.5–5.1)
SODIUM SERPL-SCNC: 140 MMOL/L (ref 136–145)

## 2021-01-08 PROCEDURE — 99232 SBSQ HOSP IP/OBS MODERATE 35: CPT | Performed by: STUDENT IN AN ORGANIZED HEALTH CARE EDUCATION/TRAINING PROGRAM

## 2021-01-08 RX ORDER — HYDROCODONE BITARTRATE AND ACETAMINOPHEN 5; 325 MG/1; MG/1
1 TABLET ORAL EVERY 8 HOURS PRN
Qty: 12 TABLET | Refills: 0 | Status: ON HOLD | OUTPATIENT
Start: 2021-01-08 | End: 2021-06-17

## 2021-01-08 RX ORDER — HYDROCODONE BITARTRATE AND ACETAMINOPHEN 5; 325 MG/1; MG/1
1 TABLET ORAL EVERY 8 HOURS PRN
Qty: 12 TABLET | Refills: 0 | Status: SHIPPED | OUTPATIENT
Start: 2021-01-08 | End: 2021-01-08

## 2021-01-08 RX ADMIN — CLOPIDOGREL BISULFATE 75 MG: 75 TABLET ORAL at 08:51:00

## 2021-01-08 RX ADMIN — PANTOPRAZOLE SODIUM 40 MG: 40 TABLET, DELAYED RELEASE ORAL at 06:03:00

## 2021-01-08 RX ADMIN — ASCORBIC ACID 500 MG: 500 MG TABLET ORAL at 08:51:00

## 2021-01-08 RX ADMIN — CARVEDILOL 3.12 MG: 3.12 TABLET ORAL at 08:52:00

## 2021-01-08 RX ADMIN — GABAPENTIN 300 MG: 300 CAPSULE ORAL at 08:52:00

## 2021-01-08 RX ADMIN — ASPIRIN 81 MG: 81 TABLET ORAL at 08:50:00

## 2021-01-08 RX ADMIN — ENALAPRIL MALEATE 5 MG: 5 TABLET ORAL at 08:52:00

## 2021-01-08 RX ADMIN — DOCUSATE SODIUM 100 MG: 100 CAPSULE, LIQUID FILLED ORAL at 08:52:00

## 2021-01-08 RX ADMIN — MELATONIN 325 MG: at 08:52:00

## 2021-01-08 RX ADMIN — BUMETANIDE 2 MG: 1 TABLET ORAL at 08:52:00

## 2021-01-08 NOTE — PROGRESS NOTES
[facility]  Progress Note     Signa Reynolds.  Patient Status:  Observation    1933 MRN [id number]   [facility] 7NE-A Attending Dontrell Gonzales MD   Hosp Day # 3 PCP None Pcp     Reason for Consult: medical management  Requ Imaging data reviewed in Epic.     Medications:   • Clopidogrel Bisulfate  75 mg Oral Daily   • apixaban  2.5 mg Oral BID   • ascorbic acid  500 mg Oral Daily   • aspirin  81 mg Oral Daily   • atorvastatin  20 mg Oral Nightly   • bumetanide  2 mg Oral BID (

## 2021-01-08 NOTE — PROGRESS NOTES
Assumed care of pt. At 0730. A&Ox4. Sats 97% on RA. Atrial Fibrillation rhythm. Rate controlled. Asymptomatic; Eliquis scheduled. L. Groin puncture site; soft/clean/intact. No acute change in neurovascular assessment.  Bilateral pedal and posterior pu

## 2021-01-08 NOTE — PROGRESS NOTES
1st attempt PCP apt request    [address] [place] [address] Internal Medicine [place]  [address] [address] [place] [address] [phone]    Unable to contact patient. Will try again.

## 2021-01-08 NOTE — PLAN OF CARE
Assumed care @ 0700. Pt returned from cath lab with L groin site and femstop in place. Pedal and post tib pulses per doppler. 1345 - BP 80/45. Pt asymptomatic. Groin dressing saturated. Site soft, tender to touch. Denies back pain.  Dr. Babak Evans and Patrick Gregorio

## 2021-01-08 NOTE — CONSULTS
[facility]  Report of Inpatient Wound Care Consultation     [facility].  Patient Status:  Inpatient    1933 MRN [id number]   [facility] 7NE-A Attending Federico Medel MD   Hosp Day # 3 PCP None Pcp     REASON FOR CONSUL --  37*  --   --    *  --   --   --   --   --   --   --  156*  --   --  130*  --   --    CA 9.0  --   --   --   --   --   --   --  9.0  --   --  8.8  --   --    ALB 3.4  --   --   --   --   --   --   --   --   --   --   --   --   --    TP 7.1  --

## 2021-01-08 NOTE — DISCHARGE PLANNING
NURSING DISCHARGE NOTE    Discharged Home via Wheelchair. Accompanied by Family member  Belongings Taken by patient/family. A&Ox4. Cleared from all physicians to DC home. AVS discharge paper reviewed with pt son at bedside.  Declines Cameroonian inter

## 2021-01-08 NOTE — PLAN OF CARE
Assumed care @ 1900. Patient alert oriented x4. On telemetry monitoring. Bedrest  Left groin intact, no pain, no bleeding, dressing dry and intact. Lower extremities pulses by doppler  Care partner clarification.  Patient states, his son Lydia Childers was pre Encourage toileting schedule  Outcome: Progressing      Problem: SKIN/TISSUE INTEGRITY - ADULT  Goal: Skin integrity remains intact  Description: INTERVENTIONS  - Assess and document risk factors for pressure ulcer development  - Assess and document skin i

## 2021-01-08 NOTE — RESPIRATORY THERAPY NOTE
ELA - Equipment Use Daily Summary:                  . Set Mode: AUTO CPAP WITH C-FLEX                . Usage in hours: 1:42                . 90% Pressure (EPAP) level: 11                . 90% Insp. Pressure (IPAP): Guadalupe Riedel AHI: 30                .

## 2021-01-08 NOTE — PROGRESS NOTES
[facility]  Cardiology Progress Note    Melisa Rosas. Patient Status:  Inpatient    1933 MRN [id number]   [facility] 7NE-A Attending Lj High MD   Hosp Day # 3 PCP None Pcp       Subjective: In bed, feeling well.  St mg Oral BID (Diuretic)   • carvedilol  3.125 mg Oral Daily   • cetirizine  10 mg Oral Nightly   • docusate sodium  100 mg Oral BID   • Enalapril Maleate  5 mg Oral Daily   • ferrous sulfate  325 mg Oral Daily with breakfast   • Pantoprazole Sodium  40 mg O previous study was available for comparison. *     Assessment:  · PAD s/p PTA right SFA, popliteal, and TP trunk with Dr. Dutch Oppenheim 1/7/21.    • PAF-appears to have hx of this in the past. Not historically on [facility].   o After discussion with his son, he states

## 2021-01-08 NOTE — PROGRESS NOTES
1st attempt Pt needed HFU for DM FU  Called Son Varsha Gonzalez ([phone]) to set up appt     Future Appointments   Date Time Provider Lesvia Huang   1/13/2021  9:00 AM Ramos Brown, [address] [facility]  [address] [place] [address]

## 2021-01-10 NOTE — DISCHARGE SUMMARY
Vascular Surgery  Surgical Discharge Summary    Admission Date: 1/5/2021   D/C Date: 1/8/2021  Discharge Diagnosis: athero with ulcer  Discharge Condition: good      HISTORY OF PRESENT ILLNESS: Signa Fowler. is a 80year old  male who was admitted fo Apply topically daily as needed. • bumetanide 2 MG Oral Tab Take 2 mg by mouth 2 (two) times daily. • carvedilol 3.125 MG Oral Tab Take 3.125 mg by mouth daily. • cetirizine 10 MG Oral Tab Take 10 mg by mouth daily as needed.      • Clopidogre the patient including follow up within 2 weeks

## 2021-01-11 ENCOUNTER — PATIENT OUTREACH (OUTPATIENT)
Dept: CASE MANAGEMENT | Age: 86
End: 2021-01-11

## 2021-01-11 PROBLEM — I25.10 CORONARY ARTERY DISEASE INVOLVING NATIVE CORONARY ARTERY OF NATIVE HEART WITHOUT ANGINA PECTORIS: Status: ACTIVE | Noted: 2021-01-11

## 2021-01-11 PROBLEM — Z79.4 TYPE 2 DIABETES MELLITUS WITH CIRCULATORY DISORDER, WITH LONG-TERM CURRENT USE OF INSULIN (HCC): Status: ACTIVE | Noted: 2021-01-11

## 2021-01-11 PROBLEM — E11.59 TYPE 2 DIABETES MELLITUS WITH CIRCULATORY DISORDER, WITH LONG-TERM CURRENT USE OF INSULIN (HCC): Status: ACTIVE | Noted: 2021-01-11

## 2021-01-11 NOTE — PROGRESS NOTES
Attempted to reach the patient to complete Hospital FU call. The was no answer on the patients line and [facility] was unable to leave a message. [facility] will try again later.

## 2021-01-11 NOTE — PROGRESS NOTES
Non-TCM VISIT  [facility] TRANSITIONAL [facility]ARE CLINIC      HISTORY   CHIEF COMPLAINT: hospital follow up visit; atherosclerosis with ulcer right leg s/p angiogram 1/7/21  HPI: Russel Valdez. is a 80year old male here today for follow up after hos Disp: 12 tablet, Rfl: 0    •  apixaban 2.5 MG Oral Tab, Take 1 tablet (2.5 mg total) by mouth 2 (two) times daily. , Disp: 180 tablet, Rfl: 3    •  acetaminophen 325 MG Oral Tab, Take 650 mg by mouth every 6 (six) hours as needed.   , Disp: , Rfl:     •  alb 3350 17 GM/SCOOP Oral Powder, Take 17 g by mouth., Disp: , Rfl:     •  Potassium Chloride ER 8 MEQ Oral Tab CR, take 2 tablet by oral route 2 times every day with food, Disp: , Rfl:     No current facility-administered medications for this visit.        HIS denies syncope, chest pain, palpitations   GI: denies abdominal pain  MUSCULOSKELETAL: denies pain, numbness or tingling of extremeties, states normal range of motion of extremities, reports unable to walk - his baseline  NEUROLOGIC: denies confusion  HEMA 1pm  · Atorvastatin, bumex, carvedilol, vasotec  · Klor-Con 8 mEq BID    4. ELA (obstructive sleep apnea)    5. Anemia  · Ferrous sulfate    6. GERD  · Omeprazole    7.   Constipation  · glycolax    No orders of the defined types were placed in this encou Disp: , Rfl:     •  Ferrous Sulfate 325 (65 Fe) MG Oral Tab, Take 325 mg by mouth daily with breakfast., Disp: , Rfl:     •  gabapentin 300 MG Oral Cap, Take 300 mg by mouth daily. , Disp: , Rfl:     •  ONETOUCH ULTRA In Vitro Strip, TEST BG BURDEN, Disp: , [facility] [address] [facility] [place])    For the safety of our patients, visitors and care team, we are asking patients not to bring anyone with them to their appointment, unless clinically required.             Izzy Kim

## 2021-01-12 NOTE — PROGRESS NOTES
NCM attempted to reach patient for HFU call. It sounds like phone is answered, however, no response and unable to leave a VM. NCM will try again later.

## 2021-01-13 ENCOUNTER — OFFICE VISIT (OUTPATIENT)
Dept: INTERNAL MEDICINE CLINIC | Facility: CLINIC | Age: 86
End: 2021-01-13
Payer: MEDICARE

## 2021-01-13 VITALS
OXYGEN SATURATION: 99 % | HEART RATE: 79 BPM | BODY MASS INDEX: 25.55 KG/M2 | WEIGHT: 172.5 LBS | DIASTOLIC BLOOD PRESSURE: 65 MMHG | HEIGHT: 69 IN | RESPIRATION RATE: 18 BRPM | SYSTOLIC BLOOD PRESSURE: 112 MMHG | TEMPERATURE: 98 F

## 2021-01-13 DIAGNOSIS — I73.9 PVD (PERIPHERAL VASCULAR DISEASE) (HCC): ICD-10-CM

## 2021-01-13 DIAGNOSIS — G47.33 OSA (OBSTRUCTIVE SLEEP APNEA): ICD-10-CM

## 2021-01-13 DIAGNOSIS — E11.59 TYPE 2 DIABETES MELLITUS WITH OTHER CIRCULATORY COMPLICATION, WITH LONG-TERM CURRENT USE OF INSULIN (HCC): ICD-10-CM

## 2021-01-13 DIAGNOSIS — L97.909 ATHEROSCLEROSIS OF ARTERY OF EXTREMITY WITH ULCERATION (HCC): Primary | ICD-10-CM

## 2021-01-13 DIAGNOSIS — I25.10 CORONARY ARTERY DISEASE INVOLVING NATIVE CORONARY ARTERY OF NATIVE HEART WITHOUT ANGINA PECTORIS: ICD-10-CM

## 2021-01-13 DIAGNOSIS — Z79.4 TYPE 2 DIABETES MELLITUS WITH OTHER CIRCULATORY COMPLICATION, WITH LONG-TERM CURRENT USE OF INSULIN (HCC): ICD-10-CM

## 2021-01-13 DIAGNOSIS — I48.20 CHRONIC ATRIAL FIBRILLATION (HCC): ICD-10-CM

## 2021-01-13 DIAGNOSIS — I70.299 ATHEROSCLEROSIS OF ARTERY OF EXTREMITY WITH ULCERATION (HCC): Primary | ICD-10-CM

## 2021-01-13 PROCEDURE — 3074F SYST BP LT 130 MM HG: CPT | Performed by: CLINICAL NURSE SPECIALIST

## 2021-01-13 PROCEDURE — 99205 OFFICE O/P NEW HI 60 MIN: CPT | Performed by: CLINICAL NURSE SPECIALIST

## 2021-01-13 PROCEDURE — 3078F DIAST BP <80 MM HG: CPT | Performed by: CLINICAL NURSE SPECIALIST

## 2021-01-13 PROCEDURE — 3008F BODY MASS INDEX DOCD: CPT | Performed by: CLINICAL NURSE SPECIALIST

## 2021-01-13 RX ORDER — INSULIN GLARGINE 100 [IU]/ML
16 INJECTION, SOLUTION SUBCUTANEOUS NIGHTLY
COMMUNITY

## 2021-01-13 RX ORDER — INSULIN LISPRO 100 [IU]/ML
6 INJECTION, SOLUTION INTRAVENOUS; SUBCUTANEOUS
COMMUNITY

## 2021-01-13 RX ORDER — FAMOTIDINE 40 MG/1
40 TABLET, FILM COATED ORAL DAILY
COMMUNITY

## 2021-01-13 RX ORDER — MELATONIN
325
Qty: 30 TABLET | Refills: 0 | Status: SHIPPED | OUTPATIENT
Start: 2021-01-13

## 2021-01-13 RX ORDER — ENALAPRIL MALEATE 5 MG/1
5 TABLET ORAL DAILY
Qty: 30 TABLET | Refills: 0 | Status: ON HOLD | OUTPATIENT
Start: 2021-01-13 | End: 2021-06-17

## 2021-01-13 RX ORDER — CLOPIDOGREL BISULFATE 75 MG/1
75 TABLET ORAL DAILY
Qty: 30 TABLET | Refills: 0 | Status: CANCELLED | OUTPATIENT
Start: 2021-01-13

## 2021-01-13 RX ORDER — LOSARTAN POTASSIUM 25 MG/1
25 TABLET ORAL DAILY
COMMUNITY

## 2021-01-13 RX ORDER — CLOPIDOGREL BISULFATE 75 MG/1
75 TABLET ORAL DAILY
Qty: 30 TABLET | Refills: 0 | Status: SHIPPED | OUTPATIENT
Start: 2021-01-13

## 2021-01-13 RX ORDER — METFORMIN HYDROCHLORIDE 500 MG/1
1 TABLET, FILM COATED, EXTENDED RELEASE ORAL
COMMUNITY

## 2021-01-13 RX ORDER — ATORVASTATIN CALCIUM 20 MG/1
20 TABLET, FILM COATED ORAL DAILY
Qty: 30 TABLET | Refills: 0 | Status: SHIPPED | OUTPATIENT
Start: 2021-01-13

## 2021-01-13 NOTE — PROGRESS NOTES
Cardiology apt made with Tuyet Cole January 26, 2021 at 1:00pm Ga Acosta)  Future Appointments   Date Time Provider Lesvia Huang   1/15/2021  9:00  N Grabiel Power   1/19/2021  9:00 AM Safia Funk MD [facility] [place]   1/27/2

## 2021-01-13 NOTE — PATIENT INSTRUCTIONS
Patient Instructions:  1. Please call the [address] [place] [address] at [phone] when you get home so we can review all the medications you have at home. Please call before 2pm today.   2.  If you do not have CLOPIDOGREL at home, it is very important th

## 2021-01-13 NOTE — PROGRESS NOTES
TRANSITIONAL CARE CLINIC PHARMACIST MEDICATION RECONCILIATION        Melisa Rosas.  MRN [id number]    1933 PCP None Pcp       Comments: Medication history completed by the [address] [place] [address] Pharmacist with the patient in the office (via Oral Tab Take 5 mg by mouth daily. • Ferrous Sulfate 325 (65 Fe) MG Oral Tab Take 325 mg by mouth daily with breakfast.   • gabapentin 300 MG Oral Cap Take 300 mg by mouth daily.    • ONETOUCH ULTRA In Vitro Strip TEST BG QID   • Insulin Aspart Pen 100 UN Clopidogrel. Reviewed all medications in detail with patient including dose, indication, timing of administration, monitoring parameters, and potential side effects of medications. Patient confirmed understanding.      Thank you,    Gregg Alex, Ph

## 2021-01-14 ENCOUNTER — APPOINTMENT (OUTPATIENT)
Dept: WOUND CARE | Facility: HOSPITAL | Age: 86
End: 2021-01-14
Attending: FAMILY MEDICINE
Payer: MEDICARE

## 2021-01-14 NOTE — PROGRESS NOTES
Pt's son canceled appt with the wound clinic, attempted twice to assist son in scheduling an appt but pt was not able to make them. Finally scheduled pt with wound clinic for 1/22 at 11:00.  Pt's son verbalized understanding

## 2021-01-14 NOTE — PROGRESS NOTES
Multiple attempts to contact the pt for a HFU call without a patient call back. The patient was seen for Hospital FU appointment with the [facility] on 1/13/2021. NCM closing encounter.

## 2021-01-15 ENCOUNTER — APPOINTMENT (OUTPATIENT)
Dept: WOUND CARE | Facility: HOSPITAL | Age: 86
End: 2021-01-15
Attending: INTERNAL MEDICINE
Payer: MEDICARE

## 2021-01-22 ENCOUNTER — APPOINTMENT (OUTPATIENT)
Dept: WOUND CARE | Facility: HOSPITAL | Age: 86
End: 2021-01-22
Attending: INTERNAL MEDICINE
Payer: MEDICARE

## 2021-02-11 PROBLEM — I25.5 ISCHEMIC CARDIOMYOPATHY: Status: ACTIVE | Noted: 2019-03-30

## 2021-02-11 PROBLEM — I70.299 ATHEROSCLEROSIS OF ARTERY OF EXTREMITY WITH ULCERATION (CMD): Status: ACTIVE | Noted: 2021-01-01

## 2021-02-11 PROBLEM — I73.9 PVD (PERIPHERAL VASCULAR DISEASE) (CMD): Status: ACTIVE | Noted: 2021-01-01

## 2021-02-11 PROBLEM — I10 ESSENTIAL (PRIMARY) HYPERTENSION: Status: ACTIVE | Noted: 2019-03-30

## 2021-02-11 PROBLEM — I50.30 (HFPEF) HEART FAILURE WITH PRESERVED EJECTION FRACTION (CMD): Status: ACTIVE | Noted: 2019-05-31

## 2021-02-11 PROBLEM — G47.33 OSA (OBSTRUCTIVE SLEEP APNEA): Status: ACTIVE | Noted: 2020-01-25

## 2021-02-11 PROBLEM — N18.30 CKD (CHRONIC KIDNEY DISEASE) STAGE 3, GFR 30-59 ML/MIN (CMD): Status: ACTIVE | Noted: 2017-07-07

## 2021-02-11 PROBLEM — L97.909 ATHEROSCLEROSIS OF ARTERY OF EXTREMITY WITH ULCERATION (CMD): Status: ACTIVE | Noted: 2021-01-01

## 2021-03-27 ENCOUNTER — LAB ENCOUNTER (OUTPATIENT)
Dept: LAB | Facility: HOSPITAL | Age: 86
End: 2021-03-27
Attending: SURGERY
Payer: MEDICARE

## 2021-03-27 DIAGNOSIS — L97.401 ULCER OF HEEL AND MIDFOOT, UNSPECIFIED LATERALITY, LIMITED TO BREAKDOWN OF SKIN (HCC): ICD-10-CM

## 2021-03-27 LAB — SARS-COV-2 RNA RESP QL NAA+PROBE: NOT DETECTED

## 2021-03-30 ENCOUNTER — HOSPITAL ENCOUNTER (OUTPATIENT)
Dept: INTERVENTIONAL RADIOLOGY/VASCULAR | Facility: HOSPITAL | Age: 86
Discharge: HOME | End: 2021-03-30
Attending: SURGERY | Admitting: SURGERY
Payer: MEDICARE

## 2021-03-30 VITALS
OXYGEN SATURATION: 100 % | BODY MASS INDEX: 25.46 KG/M2 | WEIGHT: 168 LBS | SYSTOLIC BLOOD PRESSURE: 103 MMHG | TEMPERATURE: 97 F | RESPIRATION RATE: 18 BRPM | HEIGHT: 68 IN | DIASTOLIC BLOOD PRESSURE: 53 MMHG | HEART RATE: 83 BPM

## 2021-03-30 DIAGNOSIS — L97.401 ULCER OF HEEL AND MIDFOOT, UNSPECIFIED LATERALITY, LIMITED TO BREAKDOWN OF SKIN (HCC): Primary | ICD-10-CM

## 2021-03-30 PROCEDURE — 36246 INS CATH ABD/L-EXT ART 2ND: CPT

## 2021-03-30 PROCEDURE — 99152 MOD SED SAME PHYS/QHP 5/>YRS: CPT

## 2021-03-30 PROCEDURE — 82962 GLUCOSE BLOOD TEST: CPT

## 2021-03-30 PROCEDURE — 75710 ARTERY X-RAYS ARM/LEG: CPT

## 2021-03-30 PROCEDURE — B41GYZZ FLUOROSCOPY OF LEFT LOWER EXTREMITY ARTERIES USING OTHER CONTRAST: ICD-10-PCS | Performed by: SURGERY

## 2021-03-30 RX ORDER — SODIUM CHLORIDE 9 MG/ML
INJECTION, SOLUTION INTRAVENOUS CONTINUOUS
Status: DISCONTINUED | OUTPATIENT
Start: 2021-03-30 | End: 2021-03-31

## 2021-03-30 RX ORDER — MIDAZOLAM HYDROCHLORIDE 1 MG/ML
INJECTION INTRAMUSCULAR; INTRAVENOUS
Status: COMPLETED
Start: 2021-03-30 | End: 2021-03-30

## 2021-03-30 RX ORDER — HEPARIN SODIUM 5000 [USP'U]/ML
INJECTION, SOLUTION INTRAVENOUS; SUBCUTANEOUS
Status: COMPLETED
Start: 2021-03-30 | End: 2021-03-30

## 2021-03-30 RX ORDER — LIDOCAINE HYDROCHLORIDE 10 MG/ML
INJECTION, SOLUTION EPIDURAL; INFILTRATION; INTRACAUDAL; PERINEURAL
Status: COMPLETED
Start: 2021-03-30 | End: 2021-03-30

## 2021-03-30 RX ADMIN — SODIUM CHLORIDE: 9 INJECTION, SOLUTION INTRAVENOUS at 09:00:00

## 2021-03-30 NOTE — PLAN OF CARE
Patient had LLE PV angiogram with Dr. Zoya Schuster today. Right groin site with dressing in place, soft, CDI, +1 pedal pulse. VSS. Son is @ bedside. Patient completed bedrest time. HOB elevated. Groin stable. Patient tolerating po intake.  Patient up Bina Barboza

## 2021-04-01 NOTE — OPERATIVE REPORT
[address]    PATIENT'S NAME: Radha Beverly   ATTENDING PHYSICIAN: Justo Guerrero M.D. OPERATING PHYSICIAN: Justo Guerrero M.D.    PATIENT ACCOUNT#:   [de-identified]    LOCATION:  [address]  MEDICAL RECORD #:   [id number] common femoral artery with a micropuncture kit. I then advanced a Glidewire Advantage wire into the aorta and exchanged the micropuncture sheath for a 5-English sheath.   Using a Crossover catheter, I hooked the aortic bifurcation, did an angiogram of the l

## 2021-04-22 ENCOUNTER — LAB ENCOUNTER (OUTPATIENT)
Dept: LAB | Facility: HOSPITAL | Age: 86
End: 2021-04-22
Attending: SURGERY
Payer: MEDICARE

## 2021-04-22 DIAGNOSIS — Z01.818 PREOP TESTING: ICD-10-CM

## 2021-04-22 PROCEDURE — 86900 BLOOD TYPING SEROLOGIC ABO: CPT

## 2021-04-22 PROCEDURE — 86900 BLOOD TYPING SEROLOGIC ABO: CPT | Performed by: SURGERY

## 2021-04-22 PROCEDURE — 86901 BLOOD TYPING SEROLOGIC RH(D): CPT | Performed by: SURGERY

## 2021-04-22 PROCEDURE — 86901 BLOOD TYPING SEROLOGIC RH(D): CPT

## 2021-04-22 PROCEDURE — 86850 RBC ANTIBODY SCREEN: CPT

## 2021-04-22 PROCEDURE — 86850 RBC ANTIBODY SCREEN: CPT | Performed by: SURGERY

## 2021-06-08 ENCOUNTER — EKG ENCOUNTER (OUTPATIENT)
Dept: LAB | Facility: HOSPITAL | Age: 86
DRG: 253 | End: 2021-06-08
Attending: SURGERY
Payer: MEDICARE

## 2021-06-08 DIAGNOSIS — I70.299 ATHEROSCLEROSIS OF ARTERY OF EXTREMITY WITH ULCERATION (HCC): ICD-10-CM

## 2021-06-08 DIAGNOSIS — Z91.89 AT RISK FOR BLEEDING: ICD-10-CM

## 2021-06-08 DIAGNOSIS — Z01.818 PREOP TESTING: ICD-10-CM

## 2021-06-08 DIAGNOSIS — L97.909 ATHEROSCLEROSIS OF ARTERY OF EXTREMITY WITH ULCERATION (HCC): ICD-10-CM

## 2021-06-08 PROCEDURE — 86850 RBC ANTIBODY SCREEN: CPT

## 2021-06-08 PROCEDURE — 86900 BLOOD TYPING SEROLOGIC ABO: CPT

## 2021-06-08 PROCEDURE — 93010 ELECTROCARDIOGRAM REPORT: CPT | Performed by: INTERNAL MEDICINE

## 2021-06-08 PROCEDURE — 80053 COMPREHEN METABOLIC PANEL: CPT

## 2021-06-08 PROCEDURE — 85610 PROTHROMBIN TIME: CPT

## 2021-06-08 PROCEDURE — 86901 BLOOD TYPING SEROLOGIC RH(D): CPT

## 2021-06-08 PROCEDURE — 93005 ELECTROCARDIOGRAM TRACING: CPT

## 2021-06-08 PROCEDURE — 85730 THROMBOPLASTIN TIME PARTIAL: CPT

## 2021-06-08 PROCEDURE — 85025 COMPLETE CBC W/AUTO DIFF WBC: CPT

## 2021-06-08 PROCEDURE — 36415 COLL VENOUS BLD VENIPUNCTURE: CPT

## 2021-06-10 ENCOUNTER — ANESTHESIA EVENT (OUTPATIENT)
Dept: CARDIAC SURGERY | Facility: HOSPITAL | Age: 86
DRG: 253 | End: 2021-06-10
Payer: MEDICARE

## 2021-06-10 ENCOUNTER — ANESTHESIA (OUTPATIENT)
Dept: CARDIAC SURGERY | Facility: HOSPITAL | Age: 86
DRG: 253 | End: 2021-06-10
Payer: MEDICARE

## 2021-06-10 ENCOUNTER — HOSPITAL ENCOUNTER (INPATIENT)
Facility: HOSPITAL | Age: 86
LOS: 7 days | Discharge: NURSING, REHAB OR LONG-TERM CARE | DRG: 253 | End: 2021-06-17
Attending: SURGERY | Admitting: SURGERY
Payer: MEDICARE

## 2021-06-10 DIAGNOSIS — L97.909 ATHEROSCLEROSIS OF ARTERY OF EXTREMITY WITH ULCERATION (HCC): Primary | ICD-10-CM

## 2021-06-10 DIAGNOSIS — I70.299 ATHEROSCLEROSIS OF ARTERY OF EXTREMITY WITH ULCERATION (HCC): Primary | ICD-10-CM

## 2021-06-10 DIAGNOSIS — Z91.89 AT RISK FOR BLEEDING: ICD-10-CM

## 2021-06-10 DIAGNOSIS — Z01.818 PREOP TESTING: ICD-10-CM

## 2021-06-10 PROBLEM — Z79.4 CONTROLLED TYPE 2 DIABETES MELLITUS WITH DIABETIC AUTONOMIC NEUROPATHY, WITH LONG-TERM CURRENT USE OF INSULIN (HCC): Status: ACTIVE | Noted: 2021-01-11

## 2021-06-10 PROBLEM — I25.10 CORONARY ARTERY DISEASE INVOLVING NATIVE HEART WITHOUT ANGINA PECTORIS: Status: ACTIVE | Noted: 2021-01-11

## 2021-06-10 PROBLEM — E11.43 CONTROLLED TYPE 2 DIABETES MELLITUS WITH DIABETIC AUTONOMIC NEUROPATHY, WITH LONG-TERM CURRENT USE OF INSULIN (HCC): Status: ACTIVE | Noted: 2021-01-11

## 2021-06-10 PROCEDURE — 99223 1ST HOSP IP/OBS HIGH 75: CPT | Performed by: HOSPITALIST

## 2021-06-10 PROCEDURE — 5A09357 ASSISTANCE WITH RESPIRATORY VENTILATION, LESS THAN 24 CONSECUTIVE HOURS, CONTINUOUS POSITIVE AIRWAY PRESSURE: ICD-10-PCS | Performed by: SURGERY

## 2021-06-10 PROCEDURE — 041L0KM BYPASS LEFT FEMORAL ARTERY TO PERONEAL ARTERY WITH NONAUTOLOGOUS TISSUE SUBSTITUTE, OPEN APPROACH: ICD-10-PCS | Performed by: SURGERY

## 2021-06-10 RX ORDER — SODIUM CHLORIDE, SODIUM LACTATE, POTASSIUM CHLORIDE, CALCIUM CHLORIDE 600; 310; 30; 20 MG/100ML; MG/100ML; MG/100ML; MG/100ML
INJECTION, SOLUTION INTRAVENOUS CONTINUOUS
Status: DISCONTINUED | OUTPATIENT
Start: 2021-06-10 | End: 2021-06-11

## 2021-06-10 RX ORDER — BUMETANIDE 1 MG/1
2 TABLET ORAL 2 TIMES DAILY
Status: DISCONTINUED | OUTPATIENT
Start: 2021-06-10 | End: 2021-06-13

## 2021-06-10 RX ORDER — PHENYLEPHRINE HCL 10 MG/ML
VIAL (ML) INJECTION AS NEEDED
Status: DISCONTINUED | OUTPATIENT
Start: 2021-06-10 | End: 2021-06-10 | Stop reason: SURG

## 2021-06-10 RX ORDER — DEXAMETHASONE SODIUM PHOSPHATE 4 MG/ML
4 VIAL (ML) INJECTION AS NEEDED
Status: ACTIVE | OUTPATIENT
Start: 2021-06-10 | End: 2021-06-10

## 2021-06-10 RX ORDER — HYDROMORPHONE HYDROCHLORIDE 1 MG/ML
0.4 INJECTION, SOLUTION INTRAMUSCULAR; INTRAVENOUS; SUBCUTANEOUS EVERY 5 MIN PRN
Status: ACTIVE | OUTPATIENT
Start: 2021-06-10 | End: 2021-06-10

## 2021-06-10 RX ORDER — ONDANSETRON 2 MG/ML
4 INJECTION INTRAMUSCULAR; INTRAVENOUS EVERY 6 HOURS PRN
Status: DISCONTINUED | OUTPATIENT
Start: 2021-06-10 | End: 2021-06-17

## 2021-06-10 RX ORDER — METOCLOPRAMIDE HYDROCHLORIDE 5 MG/ML
10 INJECTION INTRAMUSCULAR; INTRAVENOUS AS NEEDED
Status: ACTIVE | OUTPATIENT
Start: 2021-06-10 | End: 2021-06-10

## 2021-06-10 RX ORDER — DIPHENHYDRAMINE HYDROCHLORIDE 50 MG/ML
INJECTION INTRAMUSCULAR; INTRAVENOUS AS NEEDED
Status: DISCONTINUED | OUTPATIENT
Start: 2021-06-10 | End: 2021-06-10 | Stop reason: SURG

## 2021-06-10 RX ORDER — MORPHINE SULFATE 2 MG/ML
2 INJECTION, SOLUTION INTRAMUSCULAR; INTRAVENOUS EVERY 2 HOUR PRN
Status: DISCONTINUED | OUTPATIENT
Start: 2021-06-10 | End: 2021-06-14

## 2021-06-10 RX ORDER — DEXAMETHASONE SODIUM PHOSPHATE 4 MG/ML
VIAL (ML) INJECTION AS NEEDED
Status: DISCONTINUED | OUTPATIENT
Start: 2021-06-10 | End: 2021-06-10 | Stop reason: SURG

## 2021-06-10 RX ORDER — HYDROCODONE BITARTRATE AND ACETAMINOPHEN 5; 325 MG/1; MG/1
1 TABLET ORAL EVERY 4 HOURS PRN
Status: DISCONTINUED | OUTPATIENT
Start: 2021-06-10 | End: 2021-06-14

## 2021-06-10 RX ORDER — HYDROCODONE BITARTRATE AND ACETAMINOPHEN 10; 325 MG/1; MG/1
1 TABLET ORAL EVERY 4 HOURS PRN
Status: DISCONTINUED | OUTPATIENT
Start: 2021-06-10 | End: 2021-06-14

## 2021-06-10 RX ORDER — ENALAPRIL MALEATE 5 MG/1
5 TABLET ORAL DAILY
Status: DISCONTINUED | OUTPATIENT
Start: 2021-06-10 | End: 2021-06-13

## 2021-06-10 RX ORDER — DEXTROSE MONOHYDRATE 25 G/50ML
50 INJECTION, SOLUTION INTRAVENOUS
Status: DISCONTINUED | OUTPATIENT
Start: 2021-06-10 | End: 2021-06-10 | Stop reason: SDUPTHER

## 2021-06-10 RX ORDER — CARVEDILOL 3.12 MG/1
3.12 TABLET ORAL DAILY
Status: DISCONTINUED | OUTPATIENT
Start: 2021-06-11 | End: 2021-06-13

## 2021-06-10 RX ORDER — MIDAZOLAM HYDROCHLORIDE 1 MG/ML
1 INJECTION INTRAMUSCULAR; INTRAVENOUS EVERY 5 MIN PRN
Status: ACTIVE | OUTPATIENT
Start: 2021-06-10 | End: 2021-06-10

## 2021-06-10 RX ORDER — ALBUTEROL SULFATE 90 UG/1
2 AEROSOL, METERED RESPIRATORY (INHALATION) EVERY 6 HOURS PRN
Status: DISCONTINUED | OUTPATIENT
Start: 2021-06-10 | End: 2021-06-17

## 2021-06-10 RX ORDER — SODIUM CHLORIDE, SODIUM LACTATE, POTASSIUM CHLORIDE, CALCIUM CHLORIDE 600; 310; 30; 20 MG/100ML; MG/100ML; MG/100ML; MG/100ML
INJECTION, SOLUTION INTRAVENOUS CONTINUOUS PRN
Status: DISCONTINUED | OUTPATIENT
Start: 2021-06-10 | End: 2021-06-10 | Stop reason: SURG

## 2021-06-10 RX ORDER — ASPIRIN 81 MG/1
81 TABLET, CHEWABLE ORAL DAILY
COMMUNITY

## 2021-06-10 RX ORDER — SODIUM CHLORIDE 9 MG/ML
INJECTION, SOLUTION INTRAVENOUS CONTINUOUS PRN
Status: DISCONTINUED | OUTPATIENT
Start: 2021-06-10 | End: 2021-06-10 | Stop reason: SURG

## 2021-06-10 RX ORDER — ROCURONIUM BROMIDE 10 MG/ML
INJECTION, SOLUTION INTRAVENOUS AS NEEDED
Status: DISCONTINUED | OUTPATIENT
Start: 2021-06-10 | End: 2021-06-10 | Stop reason: SURG

## 2021-06-10 RX ORDER — CEFAZOLIN SODIUM/WATER 2 G/20 ML
SYRINGE (ML) INTRAVENOUS AS NEEDED
Status: DISCONTINUED | OUTPATIENT
Start: 2021-06-10 | End: 2021-06-10 | Stop reason: SURG

## 2021-06-10 RX ORDER — GABAPENTIN 300 MG/1
300 CAPSULE ORAL DAILY
Status: DISCONTINUED | OUTPATIENT
Start: 2021-06-10 | End: 2021-06-17

## 2021-06-10 RX ORDER — PROTAMINE SULFATE 10 MG/ML
INJECTION, SOLUTION INTRAVENOUS AS NEEDED
Status: DISCONTINUED | OUTPATIENT
Start: 2021-06-10 | End: 2021-06-10 | Stop reason: SURG

## 2021-06-10 RX ORDER — DIPHENHYDRAMINE HYDROCHLORIDE 50 MG/ML
12.5 INJECTION INTRAMUSCULAR; INTRAVENOUS AS NEEDED
Status: ACTIVE | OUTPATIENT
Start: 2021-06-10 | End: 2021-06-10

## 2021-06-10 RX ORDER — LOSARTAN POTASSIUM 25 MG/1
25 TABLET ORAL DAILY
Status: DISCONTINUED | OUTPATIENT
Start: 2021-06-10 | End: 2021-06-10

## 2021-06-10 RX ORDER — NALOXONE HYDROCHLORIDE 0.4 MG/ML
80 INJECTION, SOLUTION INTRAMUSCULAR; INTRAVENOUS; SUBCUTANEOUS AS NEEDED
Status: ACTIVE | OUTPATIENT
Start: 2021-06-10 | End: 2021-06-10

## 2021-06-10 RX ORDER — ATORVASTATIN CALCIUM 20 MG/1
20 TABLET, FILM COATED ORAL DAILY
Status: DISCONTINUED | OUTPATIENT
Start: 2021-06-10 | End: 2021-06-17

## 2021-06-10 RX ORDER — ONDANSETRON 2 MG/ML
INJECTION INTRAMUSCULAR; INTRAVENOUS AS NEEDED
Status: DISCONTINUED | OUTPATIENT
Start: 2021-06-10 | End: 2021-06-10 | Stop reason: SURG

## 2021-06-10 RX ORDER — FAMOTIDINE 20 MG/1
40 TABLET ORAL DAILY
Status: DISCONTINUED | OUTPATIENT
Start: 2021-06-11 | End: 2021-06-17

## 2021-06-10 RX ORDER — ASPIRIN 81 MG/1
81 TABLET ORAL DAILY
Status: DISCONTINUED | OUTPATIENT
Start: 2021-06-10 | End: 2021-06-17

## 2021-06-10 RX ORDER — ENOXAPARIN SODIUM 100 MG/ML
40 INJECTION SUBCUTANEOUS DAILY
Status: DISCONTINUED | OUTPATIENT
Start: 2021-06-11 | End: 2021-06-16

## 2021-06-10 RX ORDER — ONDANSETRON 2 MG/ML
4 INJECTION INTRAMUSCULAR; INTRAVENOUS AS NEEDED
Status: ACTIVE | OUTPATIENT
Start: 2021-06-10 | End: 2021-06-10

## 2021-06-10 RX ORDER — MORPHINE SULFATE 4 MG/ML
4 INJECTION, SOLUTION INTRAMUSCULAR; INTRAVENOUS EVERY 2 HOUR PRN
Status: DISCONTINUED | OUTPATIENT
Start: 2021-06-10 | End: 2021-06-14

## 2021-06-10 RX ORDER — MEPERIDINE HYDROCHLORIDE 25 MG/ML
12.5 INJECTION INTRAMUSCULAR; INTRAVENOUS; SUBCUTANEOUS AS NEEDED
Status: DISCONTINUED | OUTPATIENT
Start: 2021-06-10 | End: 2021-06-11

## 2021-06-10 RX ORDER — MORPHINE SULFATE 4 MG/ML
6 INJECTION, SOLUTION INTRAMUSCULAR; INTRAVENOUS EVERY 2 HOUR PRN
Status: DISCONTINUED | OUTPATIENT
Start: 2021-06-10 | End: 2021-06-14

## 2021-06-10 RX ORDER — HEPARIN SODIUM 5000 [USP'U]/ML
INJECTION, SOLUTION INTRAVENOUS; SUBCUTANEOUS AS NEEDED
Status: DISCONTINUED | OUTPATIENT
Start: 2021-06-10 | End: 2021-06-10 | Stop reason: SURG

## 2021-06-10 RX ORDER — SODIUM CHLORIDE 9 MG/ML
INJECTION, SOLUTION INTRAVENOUS CONTINUOUS
Status: DISCONTINUED | OUTPATIENT
Start: 2021-06-10 | End: 2021-06-11

## 2021-06-10 RX ORDER — ASPIRIN 81 MG/1
81 TABLET, CHEWABLE ORAL DAILY
Status: DISCONTINUED | OUTPATIENT
Start: 2021-06-10 | End: 2021-06-10

## 2021-06-10 RX ORDER — LIDOCAINE HYDROCHLORIDE 10 MG/ML
INJECTION, SOLUTION EPIDURAL; INFILTRATION; INTRACAUDAL; PERINEURAL AS NEEDED
Status: DISCONTINUED | OUTPATIENT
Start: 2021-06-10 | End: 2021-06-10 | Stop reason: SURG

## 2021-06-10 RX ORDER — MELATONIN
325
Status: DISCONTINUED | OUTPATIENT
Start: 2021-06-10 | End: 2021-06-17

## 2021-06-10 RX ORDER — DEXTROSE MONOHYDRATE 25 G/50ML
50 INJECTION, SOLUTION INTRAVENOUS
Status: DISCONTINUED | OUTPATIENT
Start: 2021-06-10 | End: 2021-06-17

## 2021-06-10 RX ORDER — ALBUTEROL SULFATE 2.5 MG/3ML
2.5 SOLUTION RESPIRATORY (INHALATION) EVERY 4 HOURS PRN
Status: DISCONTINUED | OUTPATIENT
Start: 2021-06-10 | End: 2021-06-17

## 2021-06-10 RX ORDER — CEFAZOLIN SODIUM/WATER 2 G/20 ML
2 SYRINGE (ML) INTRAVENOUS EVERY 8 HOURS
Status: COMPLETED | OUTPATIENT
Start: 2021-06-10 | End: 2021-06-11

## 2021-06-10 RX ADMIN — HEPARIN SODIUM 7000 UNITS: 5000 INJECTION, SOLUTION INTRAVENOUS; SUBCUTANEOUS at 09:13:00

## 2021-06-10 RX ADMIN — PHENYLEPHRINE HCL 100 MCG: 10 MG/ML VIAL (ML) INJECTION at 07:13:00

## 2021-06-10 RX ADMIN — DEXAMETHASONE SODIUM PHOSPHATE 4 MG: 4 MG/ML VIAL (ML) INJECTION at 07:13:00

## 2021-06-10 RX ADMIN — ROCURONIUM BROMIDE 50 MG: 10 INJECTION, SOLUTION INTRAVENOUS at 07:34:00

## 2021-06-10 RX ADMIN — SODIUM CHLORIDE, SODIUM LACTATE, POTASSIUM CHLORIDE, CALCIUM CHLORIDE: 600; 310; 30; 20 INJECTION, SOLUTION INTRAVENOUS at 07:09:00

## 2021-06-10 RX ADMIN — LIDOCAINE HYDROCHLORIDE 50 MG: 10 INJECTION, SOLUTION EPIDURAL; INFILTRATION; INTRACAUDAL; PERINEURAL at 07:13:00

## 2021-06-10 RX ADMIN — DIPHENHYDRAMINE HYDROCHLORIDE 12.5 MG: 50 INJECTION INTRAMUSCULAR; INTRAVENOUS at 07:13:00

## 2021-06-10 RX ADMIN — SODIUM CHLORIDE, SODIUM LACTATE, POTASSIUM CHLORIDE, CALCIUM CHLORIDE: 600; 310; 30; 20 INJECTION, SOLUTION INTRAVENOUS at 08:25:00

## 2021-06-10 RX ADMIN — SODIUM CHLORIDE: 9 INJECTION, SOLUTION INTRAVENOUS at 07:48:00

## 2021-06-10 RX ADMIN — CEFAZOLIN SODIUM/WATER 2 G: 2 G/20 ML SYRINGE (ML) INTRAVENOUS at 07:37:00

## 2021-06-10 RX ADMIN — PHENYLEPHRINE HCL 100 MCG: 10 MG/ML VIAL (ML) INJECTION at 07:22:00

## 2021-06-10 RX ADMIN — PROTAMINE SULFATE 50 MG: 10 INJECTION, SOLUTION INTRAVENOUS at 10:24:00

## 2021-06-10 RX ADMIN — ONDANSETRON 4 MG: 2 INJECTION INTRAMUSCULAR; INTRAVENOUS at 10:46:00

## 2021-06-10 RX ADMIN — ROCURONIUM BROMIDE 50 MG: 10 INJECTION, SOLUTION INTRAVENOUS at 08:52:00

## 2021-06-10 RX ADMIN — PHENYLEPHRINE HCL 100 MCG: 10 MG/ML VIAL (ML) INJECTION at 07:26:00

## 2021-06-10 NOTE — ANESTHESIA PROCEDURE NOTES
Arterial Line  Performed by: Marie Powers MD  Authorized by: Marie Powers MD     General Information and Staff    Procedure Start:  6/10/2021 7:22 AM  Procedure End:  6/10/2021 7:24 AM  Anesthesiologist:  Marie Powers MD  Performed By:

## 2021-06-10 NOTE — CONSULTS
[facility]  [facility].  Patient Status:  Inpatient    1933 MRN [id number]   [facility] 6NE-A Attending Riya Coreas MD   Hosp Day # 0 PCP PHYSICIAN NONSTAFF     Reason for consult: DM   Requested by: D Chew Tab, Chew 81 mg by mouth daily. , Disp: , Rfl:   metFORMIN HCl ER, MOD, 500 MG Oral Tablet 24 Hr, Take 1 tablet by mouth every morning before breakfast., Disp: , Rfl:   Losartan Potassium 25 MG Oral Tab, Take 25 mg by mouth daily. , Disp: , Rfl:   atorv Take 2.5 mg by nebulization every 4 (four) hours as needed. , Disp: , Rfl:   VENTOLIN  (90 Base) MCG/ACT Inhalation Aero Soln, Inhale 2 puffs into the lungs every 6 (six) hours as needed.   , Disp: , Rfl:   cetirizine 10 MG Oral Tab, Take 10 mg by m Dr Carlos Alberto Christianson   2. ASA, statin  2. DM II- insulin gtt for now and convert. A1c >10  1. Diabetic education   3. Diabetic neuropathy- gabapentin   4. CAD s/p CABG- coreg   5. CKD III likely 2/2 DM/HTN- stable   6. HTN- enalapril, bumex.  Hold ARB while on ACEinh

## 2021-06-10 NOTE — ANESTHESIA PROCEDURE NOTES
Peripheral IV  Date/Time: 6/10/2021 7:27 AM  Inserted by: Eugenio Park MD    Placement  Needle size: 16 G  Laterality: left  Location: hand  Site prep: alcohol  Attempts: 1

## 2021-06-10 NOTE — ANESTHESIA PREPROCEDURE EVALUATION
PRE-OP EVALUATION    Patient Name: Luz Salcedo.     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 , Disp: 180 tablet, Rfl: 3  Bacitracin Zinc 500 UNIT/GM External Ointment, Apply topically daily as needed. , Disp: , Rfl:   bumetanide 2 MG O septal       myocardium. 2.  Ventricular septum: The contour showed systolic flattening. 3.  Aortic valve: Trileaflet; moderately thickened, moderately calcified       leaflets. Transvalvular velocity was increased. There was mild stenosis.        No si Vitrectomy   • TOTAL KNEE REPLACEMENT       Social History    Tobacco Use      Smoking status: Former Smoker      Smokeless tobacco: Never Used    Alcohol use: Never      Drug use: Unknown     Available pre-op labs reviewed.   Lab Results   Component Value

## 2021-06-10 NOTE — PLAN OF CARE
Pt had left fem pop bypass by Dr Zoila East. Pulses to left leg by doppler. Medicated for pain prn, see MAR. Goyal and art line in place. Tolerating diet, insulin as ordered. Updated pt and family on POC. Will continue to monitor.       Problem: Peripheral

## 2021-06-10 NOTE — ANESTHESIA POSTPROCEDURE EVALUATION
Josue Rosario  Patient Status:  Inpatient   Age/Gender 80year old male MRN [id number]   Location [address] [facility] [address] Attending Diane Antunez MD   Hosp Day # 0 PCP PHYSICIAN NONSTAFF       Anesthesia Post-op Note    LEFT FEMORAL

## 2021-06-10 NOTE — BRIEF OP NOTE
Pre-Operative Diagnosis: atherosclerosis of artery of extremity with ulcerations     Post-Operative Diagnosis: atherosclerosis of artery of extremity with ulcerations      Procedure Performed:   LEFT FEMORAL ARTERY TO PERONEAL ARTERY BYPASS WITH GREAT SAPH

## 2021-06-10 NOTE — ANESTHESIA PROCEDURE NOTES
Airway  Date/Time: 6/10/2021 7:19 AM  Urgency: elective      General Information and Staff    Patient location during procedure: OR  Anesthesiologist: Alena Johnson MD  Performed: anesthesiologist     Indications and Patient Condition  Indications for

## 2021-06-10 NOTE — H&P
[facility]  Vascular Surgery Consultation    Thao Townsend.  Patient Status:  Inpatient    1933 MRN [id number]   Location [address] [facility] Attending Page Hernandez MD   Hosp Day # 0 PCP PHYSICIAN NONSTAFF       Histo encounter.     Review of Systems:    CONSTITUTIONAL: denies fever, chills  ENT: denies sore throat, nasal drainage/congestion  CHEST/CVS: denies cough, sputum, trouble breathing/SOB, chest pain, UMAÑA  GI: denies abdominal pain, heartburn, diarrhea, blood in unsuccessful. Discussed plan to use cadaver vein. Thank you for allowing me to participate in the care of your patient.     Ramo Espinal MD  6/10/2021  6:57 AM

## 2021-06-11 PROCEDURE — 99232 SBSQ HOSP IP/OBS MODERATE 35: CPT | Performed by: HOSPITALIST

## 2021-06-11 RX ORDER — POLYETHYLENE GLYCOL 3350 17 G/17G
17 POWDER, FOR SOLUTION ORAL DAILY
Status: DISCONTINUED | OUTPATIENT
Start: 2021-06-11 | End: 2021-06-17

## 2021-06-11 RX ORDER — CLOPIDOGREL BISULFATE 75 MG/1
75 TABLET ORAL DAILY
Status: DISCONTINUED | OUTPATIENT
Start: 2021-06-11 | End: 2021-06-17

## 2021-06-11 RX ORDER — POTASSIUM CHLORIDE 20 MEQ/1
40 TABLET, EXTENDED RELEASE ORAL ONCE
Status: COMPLETED | OUTPATIENT
Start: 2021-06-11 | End: 2021-06-11

## 2021-06-11 RX ORDER — FUROSEMIDE 10 MG/ML
20 INJECTION INTRAMUSCULAR; INTRAVENOUS ONCE
Status: COMPLETED | OUTPATIENT
Start: 2021-06-11 | End: 2021-06-11

## 2021-06-11 NOTE — PROGRESS NOTES
[facility]  Progress Note     Donya Hdez.  Patient Status:  Inpatient    1933 MRN [id number]   [facility] 6NE-A Attending Hemant Cannon MD   Hosp Day # 1 PCP PHYSICIAN NONSTAFF     Chief Complaint: DM   S:  Feels go PBNP in the last 168 hours. Creatinine Kinase  No results for input(s): CK in the last 168 hours. Inflammatory Markers  No results for input(s): CRP, LUANN, LDH, DDIMER in the last 168 hours. Imaging: Imaging data reviewed in Epic.   Medications:   •

## 2021-06-11 NOTE — PLAN OF CARE
Assumed care of Raegan Dexter @ approximately 9158: awake, alert, oriented, pleasant, and cooperative with care; on room with CPAP overnight; rate controlled Afib on the monitor; BLE pulses by doppler; LLE drsg C/D/I w/ no signs of bleeding, no oozing, nor hemato

## 2021-06-11 NOTE — PLAN OF CARE
Pt A&Ox4, SBP 85-90's, pt denies dizziness, afebrile, pain well controlled with prn meds. Pt up to chair and uses BSC with standby assist and walker. Doppler DP/PT pulses to left leg, dressing cdi.  Pt tolerating carb controlled diet, voiding clear, yellow

## 2021-06-11 NOTE — PROGRESS NOTES
Looks great   Son at bedside  Dressings intact  Doppler signal in bypass graft    Begin physical therapy   Transfer to tele

## 2021-06-11 NOTE — OCCUPATIONAL THERAPY NOTE
OCCUPATIONAL THERAPY EVALUATION - INPATIENT     Room Number: 3455/5269-B  Evaluation Date: 6/11/2021  Type of Evaluation: Initial  Presenting Problem: 6/10 L LE vascular surgery, see op report    Physician Order: IP Consult to Occupational Therapy  Reason chair  Other Equipment:  (RW)          Drives: No       Prior Level of Function: Independent with ADL, including showering. Uses built-in shower chair. Per PT note, \"Pt typically uses a rollator at home.   Pt has caregiver assist for shopping, cooking, er during the session. PT donned L post-op shoe with max A x 1. Encouraged the pt and son to bring a pair of shoes, so he can wear his shoe on R foot. Verbalized understanding. Cueing for hand placement. Min A to stand and to ambulate with PT and RW. Balance activities; Energy conservation/work simplification techniques;ADL training;Functional transfer training;UE strengthening/ROM; Patient/Family education;Patient/Family training  Rehab Potential : Good  Frequency (Obs): 3-5x/week  Number of Visits to Erum Samples

## 2021-06-11 NOTE — PHYSICAL THERAPY NOTE
PHYSICAL THERAPY EVALUATION - INPATIENT     Room Number: 3399/8791-T  Evaluation Date: 6/11/2021  Type of Evaluation: Initial  Physician Order: PT Eval and Treat (FWB w/ post op shoe)    Presenting Problem: S/p L femoral to peroneal artery bypass on of [place]: Pt typically uses a rollator at home. Pt has caregiver assist for shopping, cooking, errands and wound care 8-2. Son and dil are home by 4 pm daily.       SUBJECTIVE  \"My balance has not been good for years\"    Patient self-stated goal sitting on the side of the bed?: A Little   How much help from another person does the patient currently need. ..   -   Moving to and from a bed to a chair (including a wheelchair)?: A Little   -   Need to walk in hospital room?: [address],[place] [address] 3-5 complexity is considered moderate. These impairments and comorbidities manifest themselves as functional limitations in independent bed mobility, transfers, and gait.   The patient is below baseline and would benefit from skilled inpatient PT to address th

## 2021-06-12 PROCEDURE — 99232 SBSQ HOSP IP/OBS MODERATE 35: CPT | Performed by: HOSPITALIST

## 2021-06-12 NOTE — PROGRESS NOTES
[facility]  Progress Note     Lele April.  Patient Status:  Inpatient    1933 MRN [id number]   [facility] 6NE-A Attending Alcira Oshea MD   Hosp Day # 2 PCP PHYSICIAN NONSTAFF     Chief Complaint: DM   S:  Feels go the last 168 hours. Cardiac  No results for input(s): TROP, PBNP in the last 168 hours. Creatinine Kinase  No results for input(s): CK in the last 168 hours.     Inflammatory Markers  No results for input(s): CRP, LUANN, LDH, DDIMER in the last 168 hour

## 2021-06-12 NOTE — PLAN OF CARE
Received pt A/Ox4. C/o mild pain to L leg-- declined pain medications at this time. Leg elevated on pillow. Dressing dry, intact, with old drainage.   (+) BLE pulses per doppler. RLE pulses transient.    SBPs 80s-90s, patient asymptomatic.    0505- PRN No

## 2021-06-12 NOTE — PLAN OF CARE
Received patient A/Ox4, RA, NSR on tele at 0700  Incision to LLE DESIRAE d/t dressing  Left groin dressing with old drainage, soft/no hematoma  Pulses by doppler, fleeting pulses to right foot  BP low in AM, patient asymptomatic, morning medications held per M

## 2021-06-12 NOTE — PROGRESS NOTES
[facility]  Vascular Surgery Progress Note    Thao Townsend. Patient Status:  Inpatient    1933 MRN [id number]   [facility] 7NE-A Attending Page Hernandez MD   Hosp Day # 2 PCP PHYSICIAN NONSTAFF     Objective:   Temp: 98. 9 30 g, 30 g, Oral, Q15 Min PRN   Or  Glucose-Vitamin C (DEX-4) chewable tab 8 tablet, 8 tablet, Oral, Q15 Min PRN  albuterol sulfate (VENTOLIN) (2.5 MG/3ML) 0.083% nebulizer solution 2.5 mg, 2.5 mg, Nebulization, Q4H PRN  atorvastatin (LIPITOR) tab 20 mg, 2 Labs   Lab 06/08/21  0915   ALT 18   AST 19   ALB 3.7       No results for input(s): TROP in the last 168 hours.       Deanne Méndez MD  6/12/2021  10:30 AM

## 2021-06-13 PROCEDURE — 99233 SBSQ HOSP IP/OBS HIGH 50: CPT | Performed by: HOSPITALIST

## 2021-06-13 RX ORDER — ALBUMIN (HUMAN) 12.5 G/50ML
25 SOLUTION INTRAVENOUS ONCE
Status: COMPLETED | OUTPATIENT
Start: 2021-06-13 | End: 2021-06-13

## 2021-06-13 NOTE — PROGRESS NOTES
[facility]  Progress Note     Magdiel Eric.  Patient Status:  Inpatient    1933 MRN [id number]   [facility] 6NE-A Attending Isha Matamoros MD   Hosp Day # 3 PCP PHYSICIAN NONSTAFF     Chief Complaint: DM   S: No overni Results   Component Value Date    COVID19 Not Detected 06/08/2021    COVID19 Not Detected 03/27/2021    COVID19 Not Detected 01/02/2021       Pro-Calcitonin  No results for input(s): PCT in the last 168 hours.     Cardiac  No results for input(s): TROP, PBN

## 2021-06-13 NOTE — CM/SW NOTE
PT / OT recs [facility]. Sw spoke to Lauro- he is POA but pt lives with another son. He is in agreement with [facility]. Referral for [facility] sent in Aidin. [facility] liaison aware of referral.      \"HOME SITUATION  Type of Home: House   Home Layout: Two level; Able to live

## 2021-06-13 NOTE — CONSULTS
ADDENDUM:  The patient was personally seen and examined by me. I agree with the note written below with the following comments:    S: Briefly, 81 YO male hx of ICM/CAD s/p CABG, LVEF 40%, permanent AF, DM2/HTN/HL.   POD#1 L fem-peroneal cryo vein bypass (Ga Cataract    • Coronary atherosclerosis    • Diabetes ([facility] [address]    • High blood pressure    • Peripheral vascular disease ([facility] [address]    • Sleep apnea      Past Surgical History:   Procedure Laterality Date   • CABG  2008    x4 vessel   • CATARACT Right    • CATH PER breakfast  •  gabapentin (NEURONTIN) cap 300 mg, 300 mg, Oral, Daily  •  Albuterol Sulfate  (90 Base) MCG/ACT inhaler 2 puff, 2 puff, Inhalation, Q6H PRN  •  ondansetron HCl (ZOFRAN) injection 4 mg, 4 mg, Intravenous, Q6H PRN  •  Enoxaparin Sodium ( affect. Skin: Warm and dry. Laboratories and Data:  Diagnostics:  EKG: afib with RBBB, LAD. PVCs, old inferior infarct. Echo 1/2021  Conclusions:     1.  Left ventricle:  The cavity size was normal. Wall thickness was normal.       Systolic function Value Date    INR 1.33 (H) 06/10/2021    INR 1.21 (H) 06/08/2021    INR 1.1 03/24/2021       Assessment/Plan:    Hypotension  - suspect volume depleted post surgery (anemia, bladder scan with on 150ml this AM).    - would transfuse for hemoglobin less than

## 2021-06-13 NOTE — PLAN OF CARE
Received patient A/Ox4, RA, A Fib on tele at 0700  Patient with urinary retention, bladder scan q4, scrotum edematous, MD aware  Cardiology consulted, see orders  CPAP when napping   Up in chair with walker  Voiding per urinal, minimal amount  Constipated,

## 2021-06-13 NOTE — HOME CARE LIAISON
Patient provided with list of Kathryn Mccray providers from [facility], patient choice is [address]. Agency reserved in [facility] and contact information placed on AVS.   Financial interest disclosure provided to patient.

## 2021-06-13 NOTE — CM/SW NOTE
[facility] liaison informed sw that pt has [facility]     Ariana Tiwari LCSW  /Discharge Planner  [phone]

## 2021-06-13 NOTE — PLAN OF CARE
Assumed care. Patient having difficulty urinating. Patient retaining. Patient bladder scanned multiple times and given many opportunities to void and either only voided small amounts or was unable to void. Straight cath performed with 450 returned.  Penis e

## 2021-06-13 NOTE — PROGRESS NOTES
[facility]  Vascular Surgery Progress Note    You Munroe. Patient Status:  Inpatient    1933 MRN [id number]   [facility] 7NE-A Attending Kaushik Koroma MD   Hosp Day # 3 PCP PHYSICIAN NONSTAFF     Objective:   Temp: 98. 6 (PLAVIX) tab 75 mg, 75 mg, Oral, Daily  glucose (DEX4) oral liquid 15 g, 15 g, Oral, Q15 Min PRN   Or  Glucose-Vitamin C (DEX-4) chewable tab 4 tablet, 4 tablet, Oral, Q15 Min PRN   Or  dextrose 50 % injection 50 mL, 50 mL, Intravenous, Q15 Min PRN   Or  g 94.6 95.7   .0 165.0 143.0* 163.0   INR 1.21* 1.33*  --   --        Recent Labs   Lab 06/08/21  0915 06/11/21  0417 06/12/21  0543 06/13/21  0815    141  --  138   K 4.6 3.8 4.5 4.8    108  --  106   CO2 31.0 28.0  --  28.0   BUN 44* 27*

## 2021-06-14 ENCOUNTER — APPOINTMENT (OUTPATIENT)
Dept: CV DIAGNOSTICS | Facility: HOSPITAL | Age: 86
DRG: 253 | End: 2021-06-14
Attending: NURSE PRACTITIONER
Payer: MEDICARE

## 2021-06-14 PROCEDURE — 93306 TTE W/DOPPLER COMPLETE: CPT | Performed by: NURSE PRACTITIONER

## 2021-06-14 PROCEDURE — 99233 SBSQ HOSP IP/OBS HIGH 50: CPT | Performed by: HOSPITALIST

## 2021-06-14 RX ORDER — TRAMADOL HYDROCHLORIDE 50 MG/1
50 TABLET ORAL ONCE
Status: DISCONTINUED | OUTPATIENT
Start: 2021-06-14 | End: 2021-06-14

## 2021-06-14 RX ORDER — ACETAMINOPHEN 325 MG/1
650 TABLET ORAL EVERY 6 HOURS PRN
Status: DISCONTINUED | OUTPATIENT
Start: 2021-06-14 | End: 2021-06-17

## 2021-06-14 NOTE — OCCUPATIONAL THERAPY NOTE
OCCUPATIONAL THERAPY TREATMENT NOTE - INPATIENT     Room Number: 0194/8478-S  Session: 1  Number of Visits to Meet Established Goals: 5    Presenting Problem: 6/10 L LE vascular surgery, see op report    History related to current admission: Pt is a 80 y[place] site  Management Techniques: Activity promotion; Body mechanics;Repositioning     ACTIVITY TOLERANCE                         O2 SATURATIONS       ACTIVITIES OF DAILY LIVING ASSESSMENT  AM-PAC ‘6-Clicks’ Inpatient Daily Activity Short Form  How much help fro x2    Balance  Static Sitting: Supervision  Dynamic Sitting: Supervision for EOB grooming task  Static Standing:  Mod A x2    ADL  LB Dress:  Don/doff LLE sock and post-op shoe at EOB with Max A  Grooming: Brushing teeth at EOB with Min A and set up provide program).

## 2021-06-14 NOTE — PHYSICAL THERAPY NOTE
PHYSICAL THERAPY TREATMENT NOTE - INPATIENT    Room Number: 3687/6062-M     Session: 1   Number of Visits to Meet Established Goals: 5    Presenting Problem: S/p L femoral to peroneal artery bypass on 6/10/21    Problem List  Active Problems:    ELA on CP O2 WALK       AM-PAC '6-Clicks' INPATIENT SHORT FORM - BASIC MOBILITY  How much difficulty does the patient currently have. ..  -   Turning over in bed (including adjusting bedclothes, sheets and blankets)?: A Little   -   Sitting down met;In bed;Call light within reach; All patient questions and concerns addressed    ASSESSMENT   Pt demos impairments in strength, balance, endurance, and activity tolerance. Pt mobility may be limited by L LE pain.  At baseline, pt able to perform bed mobil

## 2021-06-14 NOTE — PLAN OF CARE
Assumed care of pt.  At 1930  A&O x 4- Kuwaiti speaking  Room Air- CPAP at night  Afib on telemetry- rates controlled  Pedals to doppler- Right pedal pulse absent  LLE edema- nonpitting  Left groin site- staples dressing C/D/I  Left calf incision C/D/I   Co

## 2021-06-14 NOTE — PROGRESS NOTES
[facility]  Progress Note     Sherice Caballero.  Patient Status:  Inpatient    1933 MRN [id number]   [facility] 6NE-A Attending Joleen Sky MD   Hosp Day # 4 PCP PHYSICIAN NONSTAFF     Chief Complaint: DM   S: Some conf --   --   --   --   --    TP 8.3*  --   --   --   --   --   --     < > = values in this interval not displayed. Estimated Creatinine Clearance: 34 mL/min (A) (based on SCr of 1.48 mg/dL (H)).   Recent Labs   Lab 06/08/21  0915 06/10/21  1139   PTP 15.7* now  8. HTN- hypotensive last 48 hours but much improved with albumin  1. Cardio following     9. Scrotal edema with urinary retention- straight cath PRN   10. ELA- ELA protocol   11.  PAfib- not on anticoagulation- had been on Eliquis per the [facility] but patie

## 2021-06-14 NOTE — CM/SW NOTE
Per RN, PT is now recommending [facility] for pt. Referrals for [facility] sent via Aidin - waiting for responses. DON screen requested. SW to f/u with accepting [facility] choices and family's ERIKA choice.

## 2021-06-14 NOTE — PLAN OF CARE
Assumed care @0730  VSS,   A&Ox3, confused in AM.  Norco stopped. RA    Controlled afib , b/p has been stable. Pain in leg controlled with prn Tylenol,   Up with 2 assist and walker as tolerated. Tolerating carb controlled diet. Urine retention.   Fo

## 2021-06-14 NOTE — PROGRESS NOTES
[facility] Cardiology  Progress Note    You Munroe.  Patient Status:  Inpatient    1933 MRN [id number]   [facility] 7NE-A Attending Kaushik Koroma MD   Hosp Day # 4 PCP PHYSICIAN NONSTAFF     IMPRESSION/RECOMMENDATIO (DEX4) oral liquid 15 g, 15 g, Oral, Q15 Min PRN   Or  Glucose-Vitamin C (DEX-4) chewable tab 4 tablet, 4 tablet, Oral, Q15 Min PRN   Or  dextrose 50 % injection 50 mL, 50 mL, Intravenous, Q15 Min PRN   Or  glucose (DEX4) oral liquid 30 g, 30 g, Oral, Q15

## 2021-06-14 NOTE — DIETARY NOTE
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 Body mass index is 25.44 kg/m².   IBW: 63.6kg    Labs/Meds

## 2021-06-15 PROCEDURE — 99233 SBSQ HOSP IP/OBS HIGH 50: CPT | Performed by: HOSPITALIST

## 2021-06-15 RX ORDER — FUROSEMIDE 10 MG/ML
40 INJECTION INTRAMUSCULAR; INTRAVENOUS ONCE
Status: COMPLETED | OUTPATIENT
Start: 2021-06-16 | End: 2021-06-16

## 2021-06-15 NOTE — PLAN OF CARE
Assumed care at 0730  A&Ox4  On RA, using CPAP while sleeping  Afib on tele  BLE pulses + via doppler  Hematuria noted via MD aura aware  +BM today  Will continue to monitor

## 2021-06-15 NOTE — PROGRESS NOTES
Assumed care at [address] [place] [address]. Patient confused and irritable this evening. Both sons called and spoke with patient to help reorient. Improved throughout the night. Goyal intact & draining, noted hematuria. MD aware. UA positive, IV abx started.   Dressing to LLE

## 2021-06-15 NOTE — PROGRESS NOTES
No complaints  Had urinary retention requiring chavarria   Palpable pulse in bypass graft    Continue physical therapy and discharge planning

## 2021-06-15 NOTE — CM/SW NOTE
Per RN at rounds, family wants pt to go to [address]. Saira has not responded on Aidin. Called admissions at [address] and [address] who will find out if they can accept pt for [facility].

## 2021-06-15 NOTE — PROGRESS NOTES
[facility]  Progress Note     Russel Valdez.  Patient Status:  Inpatient    1933 MRN [id number]   [facility] 6NE-A Attending Deonna Christianson MD   Hosp Day # 5 PCP PHYSICIAN NONSTAFF     Chief Complaint: DM   S: Off narco this interval not displayed. Estimated Creatinine Clearance: 31.5 mL/min (A) (based on SCr of 1.6 mg/dL (H)).   Recent Labs   Lab 06/08/21  0915 06/10/21  1139   PTP 15.7* 16.9*   INR 1.21* 1.33*     No results for input(s): TROP, CK in the last 168 gerardo resolved  1. Stopped Norco, morphine  2. Empiric abx and f/u Cx   3. Pain is controlled- only ES Tylenol for now  8. UTI with hematuria and urinary retention- possibly trauma from chavarria as well  1. abx and monitor hgb  2. Consider Urology if not improving.

## 2021-06-16 PROCEDURE — 99232 SBSQ HOSP IP/OBS MODERATE 35: CPT | Performed by: INTERNAL MEDICINE

## 2021-06-16 NOTE — PROGRESS NOTES
[facility] [address] [place] [address] Cardiology  Progress Note    Donya Hdez.  Patient Status:  Inpatient    1933 MRN [id number]   [facility] 7NE-A Attending Hemant Cannon MD   Hosp Day # 5 PCP PHYSICIAN NONSTAFF     IMPRESSION/RECOMMENDATIO flextouch 14 Units, 14 Units, Subcutaneous, Daily  acetaminophen (TYLENOL) tab 650 mg, 650 mg, Oral, Q6H PRN  metoprolol tartrate (LOPRESSOR) partial tablet 12.5 mg, 12.5 mg, Oral, 2x Daily(Beta Blocker)  PEG 3350 (MIRALAX) powder packet 17 g, 17 g, Oral, 06/15/2021    CA 8.2 06/15/2021       Telemetry: No malignant tachyarrhythmias or bradyarrhythmias      Thank you for allowing our practice to participate in the care of your patient. Please do not hesitate to contact me if you have any questions.     Mal Aaron

## 2021-06-16 NOTE — PLAN OF CARE
Assumed care at 0730. A&OX3. Does not know Month. Tele - A-Fib controlled. Lt Leg incision with staples, dressing changed - Abdominal pad & ace wrap. Rt groin with gauze dressing. Goyal draining chloe urine, no clots noted today.   Up in chair with asst

## 2021-06-16 NOTE — CDS QUERY
Clinical Significance - Lab Results Associated with Blood Loss  CLINICAL DOCUMENTATION CLARIFICATION FORM  Dear Doctor:  Clinical information (provided below) indicates blood loss and abnormal blood counts.  For accurate ICD-10-CM code assignment ,  PLEASE

## 2021-06-16 NOTE — PROGRESS NOTES
[facility] Cardiology  Progress Note    Isaac Desiree.  Patient Status:  Inpatient    1933 MRN [id number]   [facility] 7NE-A Attending Diane Antunez MD   Hosp Day # 6 PCP PHYSICIAN NONSTAFF     IMPRESSION/RECOMMENDATIO Daily  cefTRIAXone Sodium (ROCEPHIN) 1 g in sodium chloride 0.9% 100 mL IVPB-ADDV, 1 g, Intravenous, Q24H  acetaminophen (TYLENOL) tab 650 mg, 650 mg, Oral, Q6H PRN  metoprolol tartrate (LOPRESSOR) partial tablet 12.5 mg, 12.5 mg, Oral, 2x Daily(Beta Block 47 06/16/2021    CREATSERUM 1.50 06/16/2021     06/16/2021    CA 8.4 06/16/2021       Telemetry: No malignant tachyarrhythmias or bradyarrhythmias      Thank you for allowing our practice to participate in the care of your patient.  Please do not hes

## 2021-06-16 NOTE — PROGRESS NOTES
[facility]  Progress Note     Donya Hdez.  Patient Status:  Inpatient    1933 MRN [id number]   [facility] 6NE-A Attending Hemant Cannon MD   Hosp Day # 6 PCP PHYSICIAN NONSTAFF     Chief Complaint: DM     S: No acut Detected 03/27/2021    COVID19 Not Detected 01/02/2021     Pro-Calcitonin  No results for input(s): PCT in the last 168 hours. Cardiac  No results for input(s): TROP, PBNP in the last 168 hours.     Creatinine Kinase  No results for input(s): CK in the l worsening  9. HTN- running low. S/p IVF and albumin  1. Coreg switch to metoprpolol  2. Other meds held for now  3. Cardio following     10. Scrotal edema with urinary retention- chavarria placed   11. ELA- ELA protocol   12. PAfib  1.  Resume DOAC once ok with

## 2021-06-16 NOTE — PLAN OF CARE
Assumed pt care at [address] [place] [address] for the night shift. Pt a/o x 3. Resting in bed. Reports mild discomfort to lle incisional site. Tylenol given prn w/ relief. Pedal pulses (+) per doppler. Rt pedal absent. VSS. Afebrile.  Afib on tele   w/ controled hr.O2 sats >92

## 2021-06-16 NOTE — OCCUPATIONAL THERAPY NOTE
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 Pt left sitting upright in chair with call light within reach and alarm set. Pt instructed to call the nurse should he need to get up. Pt verbalized good understanding.   Patient End of Session: Up in chair;Needs met;Call lig

## 2021-06-17 VITALS
HEART RATE: 93 BPM | OXYGEN SATURATION: 98 % | WEIGHT: 175.69 LBS | TEMPERATURE: 97 F | HEIGHT: 68 IN | BODY MASS INDEX: 26.63 KG/M2 | RESPIRATION RATE: 24 BRPM | SYSTOLIC BLOOD PRESSURE: 114 MMHG | DIASTOLIC BLOOD PRESSURE: 94 MMHG

## 2021-06-17 PROCEDURE — 99232 SBSQ HOSP IP/OBS MODERATE 35: CPT | Performed by: INTERNAL MEDICINE

## 2021-06-17 NOTE — CM/SW NOTE
Sw updated [employer] that pt may be ready to dc today - await clearance for dc.     Ariana Tiwari, NANCY  /Discharge Planner  [phone]

## 2021-06-17 NOTE — CM/SW NOTE
06/17/21 1320   Discharge disposition   Expected discharge disposition Skilled Nurs   Name of Facillity/Home [address] [facility]   Discharge transportation [facility]     [facility] today.   [employer] arranged

## 2021-06-17 NOTE — PROGRESS NOTES
[facility]  Progress Note     Driss Che.  Patient Status:  Inpatient    1933 MRN [id number]   [facility] 6NE-A Attending Carlton Fernando MD   Hosp Day # 7 PCP PHYSICIAN NONSTAFF     Chief Complaint: DM     S: No acut 06/10/21  1139   PTP 16.9*   INR 1.33*     No results for input(s): TROP, CK in the last 168 hours.      COVID-19 Lab Results  COVID-19  Lab Results   Component Value Date    COVID19 Not Detected 06/08/2021    COVID19 Not Detected 03/27/2021    COVID19 Not this morning  4. Voiding trial at [facility]  9. HTN- running low. S/p IVF and albumin  1. Coreg switched to metoprpolol  2. Other meds held for now  3. Cardio following     10. Scrotal edema with urinary retention- chavarria placed   11. ELA- ELA protocol   12.  PAfi

## 2021-06-17 NOTE — PROGRESS NOTES
06/16/21 9815   BiPAP   $ RT Standby Charge (per 15 min) 1   $ ELA Follow up charge Yes   Device RemStar - CPAP   BiPAP / CPAP CE# d4   Mode Spontaneous   Interface Patient provided mask   Control Settings   Max P 20   Min P 5   O2 Flow Rate 2 lpm   BiP

## 2021-06-17 NOTE — PROGRESS NOTES
06/17/21 0348   BiPAP   $ RT Standby Charge (per 15 min) 1   $ ELA Follow up charge Yes   Device RemStar - CPAP   BiPAP / CPAP CE# d4   Mode Spontaneous   Interface Patient provided mask   BiPAP/CPAP Monitored Parameters   ELA Protocol Yes   Toleration

## 2021-06-17 NOTE — PLAN OF CARE
Assumed care @0720  VSS,   A&Ox4, forgetful  RA    Controlled afib with rates in the 60-70's  Denies Pain,   Up with 1-2 assist and walker as tolerated. Tolerating carb controlled diet. Goyal in place with chloe colored urine. BM today.      Leg Incisi

## 2021-06-17 NOTE — PLAN OF CARE
Assumed care of pt at 1900. Pt AOx3. Afib controlled. Left leg dressing CDI. Right groin dressing DI. Goyal draining chloe. Plan to watch urine output overnight for bloody drainage. Will continue to monitor.     [address] Paged Dr. America Terrell pt urine pink

## 2021-06-23 NOTE — PROGRESS NOTES
[facility] Cardiology  Progress Note    Thao Aviles.  Patient Status:  Inpatient    1933 MRN [id number]   [facility] 7NE-A Attending Soha Sheldon MD   Hosp Day # 7 PCP PHYSICIAN NONSTAFF     IMPRESSION/RECOMMENDATIO participate in the care of your patient. Please do not hesitate to contact me if you have any questions.     Inderjit Leiva MD

## 2021-06-25 NOTE — DISCHARGE SUMMARY
Vascular Surgery  Surgical Discharge Summary    Admission Date: 6/10/2021   D/C Date: 6/17/2021  Discharge Diagnosis: atherosclerosis with ulcer  Discharge Condition: good      HISTORY OF PRESENT ILLNESS: Fletcher Riding. is a 80year old  male breakfast.     • Losartan Potassium 25 MG Oral Tab Take 25 mg by mouth daily. • atorvastatin 20 MG Oral Tab Take 1 tablet (20 mg total) by mouth daily. 30 tablet 0   • Clopidogrel Bisulfate 75 MG Oral Tab Take 1 tablet (75 mg total) by mouth daily.  27 06/17/2021   ]    Coags:  Lab Results   Component Value Date    PTT 35.3 06/10/2021    INR 1.33 (H) 06/10/2021       HOSPITAL COURSE: The patient underwent a fem peroneal bypass on 6/10/2021   for ulcers.     EXAM ON DISCHARGE:  BP (!) 114/94 (BP Location:

## (undated) DEVICE — SURGICAL POWDER 3.0G

## (undated) DEVICE — SKIN AFFIX .4ML

## (undated) DEVICE — TRANSPOSAL ULTRAFLEX DUO/QUAD ULTRA CART MANIFOLD

## (undated) DEVICE — SOL  .9 1000ML BTL

## (undated) DEVICE — INTENDED TO BE USED TO OCCLUDE, RETRACT AND IDENTIFY ARTERIES, VEINS, TENDONS AND NERVES IN SURGICAL PROCEDURES: Brand: STERION®  VESSEL LOOP

## (undated) DEVICE — SUTURE PROLENE 6-0 BV-1

## (undated) DEVICE — SUTURE VICRYL 3-0 SH

## (undated) DEVICE — SUTURE PROLENE 5-0 C-1

## (undated) DEVICE — Device

## (undated) DEVICE — BANDAGE ROLL,100% COTTON, 6 PLY, LARGE: Brand: KERLIX

## (undated) DEVICE — SUTURE SILK 2-0

## (undated) DEVICE — GOWN SURG AERO CHROME XXL

## (undated) DEVICE — ABSORBABLE HEMOSTAT (OXIDIZED REGENERATED CELLULOSE, U.S.P.): Brand: SURGICEL

## (undated) DEVICE — INDICATED FOR SURGICAL CLAMPING DURING CARDIOVASCULAR PERIPHERAL VASCULAR, AND GENERAL SURGERY.: Brand: SOFT/FIBRA® SPRING CLIP

## (undated) DEVICE — 3M™ IOBAN™ 2 ANTIMICROBIAL INCISE DRAPE 6650EZ: Brand: IOBAN™ 2

## (undated) DEVICE — TOWEL SURG OR 17X30IN BLUE

## (undated) DEVICE — SPONGE LAP 18X18IN 7IN LOOP

## (undated) DEVICE — SUTURE PROLENE 6-0 C-1

## (undated) DEVICE — STANDARD HYPODERMIC NEEDLE,POLYPROPYLENE HUB: Brand: MONOJECT

## (undated) DEVICE — CHLORAPREP 26ML APPLICATOR

## (undated) DEVICE — SOL  .9 500ML

## (undated) DEVICE — CV PACK-LF: Brand: MEDLINE INDUSTRIES, INC.

## (undated) DEVICE — 3M™ IOBAN™ 2 ANTIMICROBIAL INCISE DRAPE 6651EZ: Brand: IOBAN™ 2

## (undated) DEVICE — SUTURE SILK 3-0 SH

## (undated) DEVICE — GAUZE SPONGES,12 PLY: Brand: CURITY

## (undated) DEVICE — GEL AQUASONIC 100 20GR

## (undated) DEVICE — SUTURE SILK 3-0

## (undated) DEVICE — STERILE POLYISOPRENE POWDER-FREE SURGICAL GLOVES: Brand: PROTEXIS

## (undated) DEVICE — PROXIMATE SKIN STAPLERS (35 WIDE) CONTAINS 35 STAINLESS STEEL STAPLES (FIXED HEAD): Brand: PROXIMATE

## (undated) DEVICE — HEMOCLIP MED 24 CLIP/CARTRIDGE

## (undated) DEVICE — FLOSEAL HEMOSTATIC MATRIX, 5ML: Brand: FLOSEAL HEMOSTATIC MATRIX

## (undated) NOTE — IP AVS SNAPSHOT
[address]            (For Outpatient Use Only) Initial Admit Date: 6/10/2021   Inpt/Obs Admit Date: Inpt: 6/10/21 / Obs: N/A   Discharge Date:    Sherrill Vazquez:  [de-identified]   MRN: [de-identified]   CSN: [id number]   CEID: [id number] Group Number:  Insurance Type:    Subscriber Name:  Subscriber :    Subscriber ID:  Pt Rel to Subscriber:    Hospital Account Financial Class: Medicare    2021

## (undated) NOTE — LETTER
[facility] [address], [address]  Consent for Procedure/Sedation    Date: 01/07/2021    Time: 0900      1.  I authorize the performance upon Rella Danvers. the following: Peripheral angiography, atherectomy, percutaneous bryant Signature of person authorized                                           Relationship to  to consent for patient: ___________________________   patient: ___________________    Witness: ______________________________________  Date: _____________________

## (undated) NOTE — IP AVS SNAPSHOT
Patient Demographics     Address  [address] [place] [address] Phone  [phone] [facility])  [phone] (Mobile) *Preferred*      Emergency Contact(s)     Name Relation Home Work Mobile    [place] Son   [phone]    Emilia Wellsil    ([address]) MCG/ACT Aers  Generic drug: Albuterol Sulfate HFA      Inhale 2 puffs into the lungs every 6 (six) hours as needed. aspirin 81 MG Chew      Chew 81 mg by mouth daily.           atorvastatin 20 MG Tabs  Commonly known as: LIPITOR      Take mouth 2 (two) times daily with meals.    Anand Hernández MD               Where to Get Your Medications      These medications were sent to [address] [place] [address] 73, [phone], [address] [facility] 06/17/2021 0735   Resp  22 Filed at 06/17/2021 0735   Temp  97.8 °F (36.6 °C) Filed at 06/17/2021 0735   SpO2  99 % Filed at 06/17/2021 0735      Patient's Most Recent Weight       Most Recent Value   Patient Weight  79.7 kg (175 lb 11.3 oz)      CPAP Sett CBC W/ DIFFERENTIAL[[id number]]          Abnormal            Final result                 Please view results for these tests on the individual orders.     Specimen Information    Type Source Collected On   Blood — 06/17/21 0584            Testing Performe distal third of the leg and provides collaterals to the smaller vessels to fill the foot.       History:  Past Medical History:   Diagnosis Date   • Cataract    • Coronary atherosclerosis    • Diabetes ([address]    • High blood pressure    • Peripheral vascular nourished, in no apparent distress  HEENT: ears and throat are clear  NECK: supple, no lymphadenopathy, thyroid wnl  CAROTID: No bruits  RESPIRATORY: no rales, rhonchi, or wheezes B  CARDIO: RRR without murmur, no murmur, no gallop   ABDOMEN: soft, non-ten [[id number]] ordered by Mitchell Shaw MD at 06/13/21 1246             ADDENDUM:  The patient was personally seen and examined by me.  I agree with the note written below with the following comments:    S: Briefly, 81 YO male hx of ICM/CAD s/p CABG, LVEF 40 edema but no edema to extremities. [NH.2]     History:  Past Medical History:   Diagnosis Date   • Cataract    • Coronary atherosclerosis    • Diabetes ([facility]    • High blood pressure    • Peripheral vascular disease ([facility]    • Sleep apnea      Past Surgical (PEPCID) tab 40 mg, 40 mg, Oral, Daily  •  ferrous sulfate EC tab 325 mg, 325 mg, Oral, Daily with breakfast  •  gabapentin (NEURONTIN) cap 300 mg, 300 mg, Oral, Daily  •  Albuterol Sulfate  (90 Base) MCG/ACT inhaler 2 puff, 2 puff, Inhalation, Q6H Extremities: Without clubbing, cyanosis or edema. Peripheral pulses are 2+. Neurologic: Alert and oriented, normal affect. Skin: Warm and dry. Laboratories and Data:  Diagnostics:  EKG:[NH.1] afib with RBBB, LAD. PVCs, old inferior infarct. [NH.2] 06/13/2021    MCH 29.8 06/13/2021    MCHC 31.1 06/13/2021    RDW 13.7 06/13/2021    .0 06/13/2021     Lab Results   Component Value Date    INR 1.33 (H) 06/10/2021    INR 1.21 (H) 06/08/2021    INR 1.1 03/24/2021       Assessment/Plan:    Hypotensio *Alexy 3  One [address] Reggie Ruggiero, [address] [facility] mild to moderate    regurgitation. 4. Left atrium: The left atrial volume was markedly increased. 5. Right ventricle: The cavity size was mildly increased. Systolic function    was reduced. 6. Right atrium: The atrium was moderately to markedly dilated.  7. was mild regurgitation. Pericardium:  There was no pericardial effusion. Aorta: Aortic root: The aortic root was normal. Ascending aorta: The ascending aorta was normal. Pulmonary artery:   Not well visualized.  Systolic pressure was markedly increased, es ml/m^2   ---------   Aortic valve                                    Value          Reference  Aortic valve peak velocity, S                   2.85  m/sec    ---------  Aortic valve mean velocity, S                   2.1   m/sec    ---------  Aortic valve valve                                 Value          Reference  Tricuspid regurg peak velocity                  4.53  m/sec    ---------  Tricuspid peak RV-RA gradient                   82    mm Hg    ---------  Tricuspid maximal regurg velocity, PISA use of insulin ([facility] [address] [facility]    Coronary artery disease involving native heart without angina pectoris    Essential hypertension    Stage 3 chronic kidney disease (HCC)    Chronic combined systolic and diastolic congestive heart failure (HCC)    Hypotension    Acu up from a chair with arms (e.g., wheelchair, bedside commode, etc.): A Lot   -   Moving from lying on back to sitting on the side of the bed?: A Lot[AA. 2]   How much help from another person does the patient currently need. .. [AA.1]   -   Moving to and from baseline, pt able to perform bed mobility and toilet transfers at mod I level, currently requires mod A x2. Pt would benefit from continued skilled IP PT to address limitations and facilitate return to safe prior level of function.     The AM-PAC '6-Clicks' L LE vascular surgery, see op report    History related to current admission: Pt is a 80year old male admit on 6/10/2021 for a planned surgery of L femoral artery to peroneal artery bypass with great saphenous cryovein (6/10).   Pt with PMH of: CAD, CABG, ACTIVITIES OF DAILY LIVING ASSESSMENT  AM-PAC ‘6-Clicks’ Inpatient Daily Activity Short Form  How much help from another person does the patient currently need…  -   Putting on and taking off regular lower body clothing?: A Lot  -   Bathing (including was verbalized good understanding. Patient End of Session: Up in chair;Needs met;Call light within reach;RN aware of session/findings; All patient questions and concerns addressed; Alarm set    ASSESSMENT   Pt seen on 6/16/21 for self-care ADLs, functional mobi Number of Visits to Meet Established Goals: 5    Presenting Problem: 6/10 L LE vascular surgery, see op report    History related to current admission: Pt is a 80year old male admit on 6/10/2021 for a planned surgery of L femoral artery to peroneal artery O2 SATURATIONS       ACTIVITIES OF DAILY LIVING ASSESSMENT  AM-PAC ‘6-Clicks’ Inpatient Daily Activity Short Form  How much help from another person does the patient currently need…  -   Putting on and taking off regular lower body Supervision[CS. 2]  Dynamic Sitting:[CS.1] Supervision[CS.2] for[CS.1] EOB grooming task[CS. 2]  Static Standing:[CS.1] Mod A x2[CS. 2]    ADL  LB Dress:[CS.1]  Don/doff LLE sock and post-op shoe[CS.2] at[CS. 1] EOB[CS. 2] with[CS.1] Max A[CS. 2]  Grooming:[CS.1 supervision     Functional Transfer Goals  Patient will transfer to toilet:  with supervision     UE Exercise Program Goal  Patient will be independent with bilateral AROM HEP (home exercise program).[CS.3]     Attribution Schilling    CS. 1 - Kelly sebastian

## (undated) NOTE — LETTER
Vanna Will [address]  [address] [facility] [address], [address] [facility]  Authorization for Surgical Operation and Procedure     Date:___________                                                                                                         Time:__________ 4.   Should the need arise during my operation or immediate post-operative period, I also consent to the administration of blood and/or blood products.   Further, I understand that despite careful testing and screening of blood or blood products by antoni 8.   I recognize that in the event my procedure results in extended X-Ray/fluoroscopy time, I may develop a skin reaction. 9.  If I have a Do Not Attempt Resuscitation (DNAR) order in place, that status will be suspended while in the operating room, proc 1. IEdna agree to be cared for by an anesthesiologist, who is specially trained to monitor me and give me medicine to put me to sleep or keep me comfortable during my procedure    I understand that my anesthesiologist is not an employee or 5. My doctor has explained to me other choices available to me for my care (alternatives).   6. Pregnant Patients (“epidural”):  I understand that the risks of having an epidural (medicine given into my back to help control pain during labor), include itchi